# Patient Record
Sex: FEMALE | Race: WHITE | Employment: OTHER | URBAN - METROPOLITAN AREA
[De-identification: names, ages, dates, MRNs, and addresses within clinical notes are randomized per-mention and may not be internally consistent; named-entity substitution may affect disease eponyms.]

---

## 2017-11-21 ENCOUNTER — APPOINTMENT (OUTPATIENT)
Dept: GENERAL RADIOLOGY | Age: 72
End: 2017-11-21
Attending: PHYSICIAN ASSISTANT
Payer: MEDICARE

## 2017-11-21 ENCOUNTER — HOSPITAL ENCOUNTER (EMERGENCY)
Age: 72
Discharge: HOME | End: 2017-11-21
Attending: EMERGENCY MEDICINE
Payer: MEDICARE

## 2017-11-21 VITALS
SYSTOLIC BLOOD PRESSURE: 147 MMHG | WEIGHT: 170 LBS | OXYGEN SATURATION: 100 % | DIASTOLIC BLOOD PRESSURE: 86 MMHG | HEIGHT: 63 IN | TEMPERATURE: 97.6 F | RESPIRATION RATE: 16 BRPM | HEART RATE: 72 BPM | BODY MASS INDEX: 30.12 KG/M2

## 2017-11-21 DIAGNOSIS — S46.911A SHOULDER STRAIN, RIGHT, INITIAL ENCOUNTER: Primary | ICD-10-CM

## 2017-11-21 DIAGNOSIS — M25.511 PAIN IN JOINT OF RIGHT SHOULDER: ICD-10-CM

## 2017-11-21 PROCEDURE — 73030 X-RAY EXAM OF SHOULDER: CPT

## 2017-11-21 PROCEDURE — 99283 EMERGENCY DEPT VISIT LOW MDM: CPT

## 2017-11-21 PROCEDURE — L3670 SO ACRO/CLAV CAN WEB PRE OTS: HCPCS

## 2017-11-21 RX ORDER — TELMISARTAN 20 MG/1
TABLET ORAL DAILY
COMMUNITY

## 2017-11-21 RX ORDER — NAPROXEN 500 MG/1
500 TABLET ORAL 2 TIMES DAILY WITH MEALS
Qty: 20 TAB | Refills: 0 | Status: SHIPPED | OUTPATIENT
Start: 2017-11-21

## 2017-11-21 RX ORDER — GLUCOSAMINE SULFATE 1500 MG
POWDER IN PACKET (EA) ORAL DAILY
COMMUNITY

## 2017-11-21 RX ORDER — TIZANIDINE 2 MG/1
TABLET ORAL
Qty: 15 TAB | Refills: 0 | Status: SHIPPED | OUTPATIENT
Start: 2017-11-21

## 2017-11-21 NOTE — ED TRIAGE NOTES
Patient states right shoulder pain s/p trip and fall incident today. Patient denies LOC. Denies striking head.

## 2017-11-22 NOTE — DISCHARGE INSTRUCTIONS
Musculoskeletal Pain: Care Instructions  Your Care Instructions  Different problems with the bones, muscles, nerves, ligaments, and tendons in the body can cause pain. One or more areas of your body may ache or burn. Or they may feel tired, stiff, or sore. The medical term for this type of pain is musculoskeletal pain. It can have many different causes. Sometimes the pain is caused by an injury such as a strain or sprain. Or you might have pain from using one part of your body in the same way over and over again. This is called overuse. In some cases, the cause of the pain is another health problem such as arthritis or fibromyalgia. The doctor will examine you and ask you questions about your health to help find the cause of your pain. Blood tests or imaging tests like an X-ray may also be helpful. But sometimes doctors can't find a cause of the pain. Treatment depends on your symptoms and the cause of the pain, if known. The doctor has checked you carefully, but problems can develop later. If you notice any problems or new symptoms, get medical treatment right away. Follow-up care is a key part of your treatment and safety. Be sure to make and go to all appointments, and call your doctor if you are having problems. It's also a good idea to know your test results and keep a list of the medicines you take. How can you care for yourself at home? · Rest until you feel better. · Do not do anything that makes the pain worse. Return to exercise gradually if you feel better and your doctor says it's okay. · Be safe with medicines. Read and follow all instructions on the label. ¨ If the doctor gave you a prescription medicine for pain, take it as prescribed. ¨ If you are not taking a prescription pain medicine, ask your doctor if you can take an over-the-counter medicine. · Put ice or a cold pack on the area for 10 to 20 minutes at a time to ease pain. Put a thin cloth between the ice and your skin.   When should you call for help? Call your doctor now or seek immediate medical care if:  · You have new pain, or your pain gets worse. · You have new symptoms such as a fever, a rash, or chills. Watch closely for changes in your health, and be sure to contact your doctor if:  · You do not get better as expected. Where can you learn more? Go to Mailsuite.be  Enter Q624 in the search box to learn more about \"Musculoskeletal Pain: Care Instructions. \"   © 4744-9602 Healthwise, [employer]. Care instructions adapted under license by Nevin Pinto (which disclaims liability or warranty for this information). This care instruction is for use with your licensed healthcare professional. If you have questions about a medical condition or this instruction, always ask your healthcare professional. [address] any warranty or liability for your use of this information. Content Version: 35.1.869656; Current as of: November 20, 2015             Joint Pain: Care Instructions  Your Care Instructions    Many people have small aches and pains from overuse or injury to muscles and joints. Joint injuries often happen during sports or recreation, work tasks, or projects around the home. An overuse injury can happen when you put too much stress on a joint or when you do an activity that stresses the joint over and over, such as using the computer or rowing a boat. You can take action at home to help your muscles and joints get better. You should feel better in 1 to 2 weeks, but it can take 3 months or more to heal completely. Follow-up care is a key part of your treatment and safety. Be sure to make and go to all appointments, and call your doctor if you are having problems. It's also a good idea to know your test results and keep a list of the medicines you take. How can you care for yourself at home? · Do not put weight on the injured joint for at least a day or two.   · For the first day or two after an injury, do not take hot showers or baths, and do not use hot packs. The heat could make swelling worse. · Put ice or a cold pack on the sore joint for 10 to 20 minutes at a time. Try to do this every 1 to 2 hours for the next 3 days (when you are awake) or until the swelling goes down. Put a thin cloth between the ice and your skin. · Wrap the injury in an elastic bandage. Do not wrap it too tightly because this can cause more swelling. · Prop up the sore joint on a pillow when you ice it or anytime you sit or lie down during the next 3 days. Try to keep it above the level of your heart. This will help reduce swelling. · Take an over-the-counter pain medicine, such as acetaminophen (Tylenol), ibuprofen (Advil, Motrin), or naproxen (Aleve). Read and follow all instructions on the label. · After 1 or 2 days of rest, begin moving the joint gently. While the joint is still healing, you can begin to exercise using activities that do not strain or hurt the painful joint. When should you call for help? Call your doctor now or seek immediate medical care if:  ? · You have signs of infection, such as:  ¨ Increased pain, swelling, warmth, and redness. ¨ Red streaks leading from the joint. ¨ A fever. ? Watch closely for changes in your health, and be sure to contact your doctor if:  ? · Your movement or symptoms are not getting better after 1 to 2 weeks of home treatment. Where can you learn more? Go to http://madelyn-enio.info/. Enter P205 in the search box to learn more about \"Joint Pain: Care Instructions. \"  Current as of: March 21, 2017  Content Version: 11.4  © 0266-4036 [employer]. Care instructions adapted under license by [employer] (which disclaims liability or warranty for this information).  If you have questions about a medical condition or this instruction, always ask your healthcare professional. [employer] any warranty or liability for your use of this information.

## 2017-11-22 NOTE — ED PROVIDER NOTES
HPI Comments: Shyam Jarrett is a 71 y.o. female that presents to the Ed with a complaint of right shoulder pain folling a trip and fall earlier today. PT states that she stubbed her toe on carpet causing her to fall. Since then pt has pain to right shoulder which is worse with movement. She rates her pain as 5/10. She denies head injury and LOC    Patient is a 71 y.o. female presenting with shoulder pain. Shoulder Pain           Past Medical History:   Diagnosis Date    Hypertension        Past Surgical History:   Procedure Laterality Date    HX BLADDER SUSPENSION      HX CHOLECYSTECTOMY      HX TUBAL LIGATION           History reviewed. No pertinent family history. Social History     Social History    Marital status:      Spouse name: N/A    Number of children: N/A    Years of education: N/A     Occupational History    Not on file. Social History Main Topics    Smoking status: Never Smoker    Smokeless tobacco: Never Used    Alcohol use No    Drug use: No    Sexual activity: Not on file     Other Topics Concern    Not on file     Social History Narrative    No narrative on file         ALLERGIES: Codeine    Review of Systems   Constitutional: Negative for fatigue and fever. HENT: Negative for congestion. Respiratory: Negative for cough and shortness of breath. Cardiovascular: Negative for chest pain. Gastrointestinal: Negative for abdominal pain, diarrhea, nausea and vomiting. Genitourinary: Negative for difficulty urinating. Musculoskeletal: Positive for arthralgias and myalgias. Skin: Negative for color change and wound. Neurological: Negative for dizziness and headaches. All other systems reviewed and are negative. Vitals:    11/21/17 1817   BP: 147/86   Pulse: 72   Resp: 16   Temp: 97.6 °F (36.4 °C)   SpO2: 100%   Weight: 77.1 kg (170 lb)   Height: 5' 3\" (1.6 m)            Physical Exam   Constitutional: She is oriented to person, place, and time. She appears well-developed and well-nourished. HENT:   Head: Normocephalic and atraumatic. Nose: Nose normal.   Eyes: Conjunctivae are normal. Pupils are equal, round, and reactive to light. Neck: Normal range of motion. Cardiovascular: Normal rate. Pulmonary/Chest: Effort normal. No respiratory distress. Musculoskeletal:        Right shoulder: She exhibits decreased range of motion and tenderness. She exhibits no deformity, no laceration, no pain, no spasm, normal pulse and normal strength. Neurological: She is oriented to person, place, and time. Skin: Skin is warm and dry. Psychiatric: She has a normal mood and affect. Her behavior is normal.        MDM  Number of Diagnoses or Management Options  Diagnosis management comments: XRAY: negative for acute process    Impression: shoulder strain, shoulder pain    Plan: discharge home  Muscle relaxants and anti inflammatory medications  Follow up with PCP       Amount and/or Complexity of Data Reviewed  Tests in the radiology section of CPT®: ordered and reviewed    Risk of Complications, Morbidity, and/or Mortality  Presenting problems: low  Diagnostic procedures: low  Management options: low    Patient Progress  Patient progress: stable    ED Course       Procedures               Vitals:  Patient Vitals for the past 12 hrs:   Temp Pulse Resp BP SpO2   11/21/17 1817 97.6 °F (36.4 °C) 72 16 147/86 100 %         Medications ordered:   Medications - No data to display      Lab findings:  No results found for this or any previous visit (from the past 12 hour(s)). X-Ray, CT or other radiology findings or impressions:  XR SHOULDER RT AP/LAT MIN 2 V    (Results Pending)       Progress notes, Consult notes or additional Procedure notes:       Disposition:  Diagnosis: No diagnosis found.     Disposition: discharge    Follow-up Information     None           Patient's Medications   Start Taking    No medications on file   Continue Taking CHOLECALCIFEROL (VITAMIN D3) 1,000 UNIT CAP    Take  by mouth daily. TELMISARTAN (MICARDIS) 20 MG TABLET    Take  by mouth daily.    These Medications have changed    No medications on file   Stop Taking    No medications on file

## 2018-10-26 ENCOUNTER — HOSPITAL ENCOUNTER (OUTPATIENT)
Dept: PHYSICAL THERAPY | Age: 73
Discharge: HOME | End: 2018-10-26
Payer: MEDICARE

## 2018-10-26 PROCEDURE — 97110 THERAPEUTIC EXERCISES: CPT

## 2018-10-26 PROCEDURE — 97161 PT EVAL LOW COMPLEX 20 MIN: CPT

## 2018-10-26 PROCEDURE — G8985 CARRY GOAL STATUS: HCPCS

## 2018-10-26 PROCEDURE — G8984 CARRY CURRENT STATUS: HCPCS

## 2018-10-26 NOTE — PROGRESS NOTES
[address] [place] [address], [address] [place], [address] [place], [place] [address]y [address] [phone] fax Plan of Care/ Statement of Necessity for Physical Therapy Services Patient name: Giovanny Jc Start of Care: 10/26/2018 Referral source: [place] : 1945 Medical Diagnosis: Pain in right shoulder [M25.511] Onset Date:2017, P/O 18 Treatment Diagnosis: decrease tolerance to ADLs and activities due to pain, LOM, decreased strength right shoulder Prior Hospitalization: see medical history Provider#: [id number] Medications: Verified on Patient summary List  
 Comorbidities: none Prior Level of Function: I all areas of activities, ADLS, Yoga, retired, household chores, community activities The Plan of Care and following information is based on the information from the initial evaluation. Assessment/ watson information:74 YO female diagnosed as above and with S/S consistent with above diagnosis presents to skilled outpatient PT. CCO right arm and shoulder pain and involvement S/P Right RCR 18 out of state in [place] where she resides. She is now here locally for 6 months duration as routine to aid with care of grandchildren while daughter is serving in the [employer]. Pain at evaluation is 0/10 at rest. She is right hand dominant. Previous Treatment/Compliance: MRIs , X-rays, medication, ice , heat, preop PT, eventually leading to surgery right RCR 18. Post op she has had PT since P/O 2 weeks to the present. Pain 0, FOTO 27, Right UE HBH and HBB NA, MMT NA, PROM gentle in supine right shoulder F 140, abd 100, ER 57 and IT 52,right UE shoulder end feels tight and with mild pain, care for abd due to tenodesis. No restrictions given on order. Minimal hypersensitivity to light touch right shoulder and upper arm and moderate trigger point right teres.   
 
 
Evaluation Complexity History LOW Complexity : Zero comorbidities / personal factors that will impact the outcome / POC; Examination MEDIUM Complexity : 3 Standardized tests and measures addressing body structure, function, activity limitation and / or participation in recreation  ;Presentation LOW Complexity : Stable, uncomplicated  ;Clinical Decision Making MEDIUM Complexity : FOTO score of 26-74 Overall Complexity Rating: LOW Problem List: pain affecting function, decrease ROM, decrease strength, decrease ADL/ functional abilitiies, decrease activity tolerance, decrease flexibility/ joint mobility and other FOTO 27 Treatment Plan may include any combination of the following: Therapeutic exercise, Therapeutic activities, Neuromuscular re-education, Physical agent/modality, Manual therapy, Patient education and Self Care training Patient / Family readiness to learn indicated by: asking questions, trying to perform skills and interest 
Persons(s) to be included in education: patient (P) Barriers to Learning/Limitations: None Patient Goal (s): full use of arm  Patient Self Reported Health Status: not answered Rehabilitation Potential: good Short Term Goals: To be accomplished in 6 treatments: 
 1 patient will have established and be I with HEP to aid with progression of skilled PT program 
 EVAL issued CURRENT 
 2 patient will tolerate 30 minutes gentle AAROM -AROM exercises to aid with increase tolerance to ADLs and activities EVAL issued HEP CURRENT 
 3 patient will have FOTO 37 to show improving function with household chores EVAL 27 CURRENT Long Term Goals: To be accomplished in 12 treatments: 
 1 patient will have FOTO 47 to show improving function with household chores EVAL 27 CURRENT 
 2 patient will have gentle MMT Right shoulder 4- to aid with increase tolerance to caring for grand child EVAL NA 
 CURRENT 
 3 Patient will have AAROM-AROM  Right shoulder F/ and ER 50 to aid with increase tolerance to ADLS and activities for home. EVAL PROM F 140, , ER 57, IR 52 CURRENT Frequency / Duration: Patient to be seen 2 times per week for 12 treatments. Patient/ Caregiver education and instruction: Diagnosis, prognosis, self care, activity modification, brace/ splint application and exercises, desensitization massage, GENTLE with abduction and wand exercises 
 [x]  Plan of care has been reviewed with PTA 
 
G-Codes (GP) Carry  Current  CL= 60-79%  Goal  CK= 40-59% The severity rating is based on clinical judgment and the FOTO score. Certification Period: 10/26/18 - 1/24/19 Stephani Pierre, PT 10/26/2018 8:26 AM 
 
________________________________________________________________________ I certify that the above Therapy Services are being furnished while the patient is under my care. I agree with the treatment plan and certify that this therapy is necessary. [de-identified] Signature:____________Date:_________TIME:________ 
 
[employer], Date and Time must be completed for valid certification ** Please sign and return to [address], [address],[facility] 300 [phone] fax

## 2018-10-26 NOTE — PROGRESS NOTES
PT DAILY TREATMENT NOTE/SHOULDER EVAL 10-18 Patient Name: Lara Gowers Date:10/26/2018 : 1945 [x]  Patient  Verified Payor: [place] MEDICARE / Plan: Darrell Mendenhall y / Product Type: Medicare / In time:848  Out time:925 Total Treatment Time (min): 37 Visit #: 1 of 12 Medicare/BCBS Only Total Timed Codes (min):  8 1:1 Treatment Time:  8 Treatment Area: Pain in right shoulder [M25.511] SUBJECTIVE Pain Level (0-10 scale): 0 
[]constant [x]intermittent [x]improving []worsening []no change since onset WORSE taking it out of sling, letting it hang, muscles are tender, BETTER sling, ice and heat, motrin Any medication changes, allergies to medications, adverse drug reactions, diagnosis change, or new procedure performed?: [x] No    [] Yes (see summary sheet for update) Subjective functional status/changes:    
PLOF: I all areas of activities, ADLS, Yoga, retired, household chores, community activities Limitations to PLOF: P/O, pain, weakness Mechanism of Injury: Fall 2017 eventually leading to surgery 18 Current symptoms/Complaints: 69 YO female diagnosed as above and with S/S consistent with above diagnosis presents to skilled outpatient PT. CCO right arm and shoulder pain and involvement S/P Right RCR 18 out of state in [place] where she resides. She is now here locally for 6 months duration as routine to aid with care of grandchildren while daughter is serving in the [employer]. Pain at evaluation is 0/10 at rest. She is right hand dominant. Previous Treatment/Compliance: MRIs , X-rays, medication, ice , heat, preop PT, eventually leading to surgery right RCR 18. Post op she has had PT since P/O 2 weeks to the present. PMHx/Surgical Hx: none Work Hx: retired, cares for grandchildren 6 months out of the year Living Situation: presently 2 story house, lives with spouse and grandchildren Pt Goals: full use of arm Barriers: [x]pain []financial []time []transportation []other Motivation: HIGH Substance use: []Alcohol []Tobacco []other:  
FABQ Score: []low []elevate Cognition: A & O x 4   Other: OBJECTIVE/EXAMINATION Domestic Life: retired, caring for grandchildren, household chores, community, Activity/Recreational Limitations: pain, P/O Mobility: I Self Care: I Modality rationale:    
Type Additional Details  
[] Estim:  []Unatt       []IFC  []Premod []Other:  []w/ice   []w/heat Position: Location:  
[] Estim: []Att    []TENS instruct  []NMES []Other:  []w/US   []w/ice   []w/heat Position: Location:  
[]  Traction: [] Cervical       []Lumbar 
                     [] Prone          []Supine []Intermittent   []Continuous Lbs: 
[] before manual 
[] after manual  
[]  Ultrasound: []Continuous   [] Pulsed []1MHz   []3MHz Location: 
W/cm2:  
[]  Iontophoresis with dexamethasone Location: [] Take home patch  
[] In clinic  
[]  Ice     []  heat 
[]  Ice massage 
[]  Laser  
[]  Anodyne Position: Location:  
[]  Laser with stim 
[]  Other: Position: Location:  
[]  Vasopneumatic Device Pressure:       [] lo [] med [] hi  
Temperature: [] lo [] med [] hi  
[] Skin assessment post-treatment:  []intact []redness- no adverse reaction 
  []redness  adverse reaction:  
 
29 min [x]Eval                  []Re-Eval  
 
 
8 min Therapeutic Exercise:  [x] See flow sheet :  
Rationale: increase ROM, increase strength and improve coordination to improve the patients ability to aid with increase tolerance to ADLS and activities 
 
 min Therapeutic Activity:  []  See flow sheet :  
Rationale:   to improve the patients ability to  
  
 min Neuromuscular Re-education:  []  See flow sheet :  
Rationale:   to improve the patients ability to  
 
 min Manual Therapy:    
Rationale:  to  
 
 min Gait Training:  ___ feet with ___ device on level surfaces with ___ level of assist  
Rationale: With 
 [] TE 
 [] TA 
 [] neuro 
 [] other: Patient Education: [x] Review HEP [] Progressed/Changed HEP based on:  
[] positioning   [] body mechanics   [] transfers   [] heat/ice application   
[] other:   
 
Other Objective/Functional Measures: FALLS RISK IS LOW , no AD use, no LOB observed Physical Therapy Evaluation - Shoulder Posture: [] Poor    [x] Fair    [] Good    Describe: 
 
ROM:  [] Unable to assess at this time Left UE HBH able, HBB to right scapula AROM                                                              PROM Left Right  Left Right Flexion 150  Flexion   140 Extension   Extension Scaption/  Scaptin/ABD   100 ER @ 0 Degrees 65  ER @ 0 Degrees   57 ER @ 90 Degrees   ER @ 90 Degrees IR @ 0 Degrees abdomen  IR @ 0 Degrees   52 End Feel / Painful Arc:right UE shoulder end feels tight with pain Strength:   [x] Unable to assess at this time     Right UE D/T P/O                                                                     
 L (1-5) R (1-5) Pain Flexors 4+ NA [] Yes   [] No  
Abductors 4+ NA [] Yes   [] No  
External Rotators 4+ NA [] Yes   [] No  
Internal Rotators 4+ NA [] Yes   [] No  
Supraspinatus   [] Yes   [] No  
Serratus Anterior   [] Yes   [] No  
Lower Trapezius   [] Yes   [] No  
Elbow Flexion   [] Yes   [] No  
Elbow Extension   [] Yes   [] No  
 
 
Scapulohumoral Control / Rhythm: 
Able to eccentrically lower with good control? Left: [] Yes   [] No     Right: [] Yes   [] No 
 
Accessory Motions: 
 
Palpation 
[x] Min  [] Mod  [] Severe    Location:hypersensitivity to light touch right shoulder and upper arm 
[] Min  [x] Mod  [] Severe    Location:Trigger point right teres [] Min  [] Mod  [] Severe    Location: 
 
Optional Tests:   
Sensation Left Right Reflexes Left Right Biceps (C5)   Biceps (C5) Gambino Radial(C6-7)   Brachioradialis (C6) Gambino Ulnar(C8-T1)   Triceps (C7) Adson's Test  [] Pos   [] Neg Yergason's Test [] Pos   [] Neg 
Melinda's Test  [] Pos   [] Neg De Witt's Sign [] Pos   [] Neg Neer's Test  [] Pos   [] Neg Clunk Test  [] Pos   [] Neg 
Hawkin's Test  [] Pos   [] Neg AC Joint  [] Pos   [] Neg 
Speed's Test  [] Pos   [] Neg SC Joint  [] Pos   [] Neg 
Empty Can  [] Pos   [] Neg Pectoral Tightness [] Pos   [] Neg Anterior Apprehension [] Pos   [] Neg  
Posterior Apprehension [] Pos   [] Neg Other Tests / Comments:  
  
 
Pain Level (0-10 scale) post treatment: 0 
 
ASSESSMENT/Changes in Function: Patient demonstrates the potential to make gains with improved ROM, strength, endurance/activity tolerance, functional FOTO survey score  and all within a reasonable time frame so as to increase their functional independence with ADLs and activities for carryover to  Improved quality of life, tolerance to household chores and community activities and caring for grandchildren. Patient requires skilled Physical Therapy so as to monitor their response to and modify their treatment plan accordingly. Patient appears to be an appropriate candidate for skilled outpatient Physical Therapy. Patient will continue to benefit from skilled PT services to modify and progress therapeutic interventions, address ROM deficits, address strength deficits, analyze and address soft tissue restrictions, analyze and cue movement patterns, analyze and modify body mechanics/ergonomics, assess and modify postural abnormalities and instruct in home and community integration to attain remaining goals. [x]  See Plan of Care 
[]  See progress note/recertification 
[]  See Discharge Summary Progress towards goals / Updated goals: PLAN [x]  Upgrade activities as tolerated     [x]  Continue plan of care 
[]  Update interventions per flow sheet []  Discharge due to:_ 
[]  Other:_ Hollie Rizvi, PT 10/26/2018  8:25 AM

## 2018-10-31 ENCOUNTER — HOSPITAL ENCOUNTER (OUTPATIENT)
Dept: PHYSICAL THERAPY | Age: 73
Discharge: HOME | End: 2018-10-31
Payer: MEDICARE

## 2018-10-31 PROCEDURE — 97140 MANUAL THERAPY 1/> REGIONS: CPT

## 2018-10-31 PROCEDURE — 97110 THERAPEUTIC EXERCISES: CPT

## 2018-10-31 PROCEDURE — 97014 ELECTRIC STIMULATION THERAPY: CPT

## 2018-10-31 NOTE — PROGRESS NOTES
PT DAILY TREATMENT NOTE 10-18 Patient Name: Liberty Patterson Date:10/31/2018 : 1945 [x]  Patient  Verified Payor: [place] MED[place]ARE / Plan: Darrell Naranjo / Product Type: Medicare / In time:1157  Out time:100 Total Treatment Time (min): 60 Visit #: 2 of 12 Medicare/BCBS Only Total Timed Codes (min):  45 1:1 Treatment Time:  45  
 
 
Treatment Area: Pain in right shoulder [M25.511] SUBJECTIVE Pain Level (0-10 scale): 0 Any medication changes, allergies to medications, adverse drug reactions, diagnosis change, or new procedure performed?: [x] No    [] Yes (see summary sheet for update) Subjective functional status/changes:   [] No changes reported Mid back pain, I have some trouble with my back going out but have not had the chance to get it checked out. I was having some shoulder pain last night that was radiating down in to the arm in the muscle , and woke up one time with my hand numb OBJECTIVE Modality rationale: decrease inflammation, decrease pain and increase tissue extensibility to improve the patients ability to aid with increase tolerance to ADLs and activities Min Type Additional Details 15 [x] Estim:  [x]Unatt       [x]IFC  []Premod []Other:  [x]w/ice   []w/heat Position:reclined Location:right shoulder and medial infrascapular region  
 [] Estim: []Att    []TENS instruct  []NMES []Other:  []w/US   []w/ice   []w/heat Position: Location:  
 []  Traction: [] Cervical       []Lumbar 
                     [] Prone          []Supine []Intermittent   []Continuous Lbs: 
[] before manual 
[] after manual  
 []  Ultrasound: []Continuous   [] Pulsed []1MHz   []3MHz W/cm2: 
Location:  
 []  Iontophoresis with dexamethasone Location: [] Take home patch  
[] In clinic  
 []  Ice     []  heat 
[]  Ice massage 
[]  Laser  
[]  Anodyne Position: Location: []  Laser with stim 
[]  Other:  Position: Location:  
 []  Vasopneumatic Device Pressure:       [] lo [] med [] hi  
Temperature: [] lo [] med [] hi  
[] Skin assessment post-treatment:  []intact []redness- no adverse reaction 
  []redness  adverse reaction:  
 
  min []Eval                  []Re-Eval  
 
 
37 min Therapeutic Exercise:  [x] See flow sheet :  
Rationale: increase ROM, increase strength and improve coordination to improve the patients ability to aid with increase tolerance to ADLS and activities 
 
 min Therapeutic Activity:  []  See flow sheet :  
Rationale:   to improve the patients ability to  
  
 min Neuromuscular Re-education:  []  See flow sheet :  
Rationale:  to improve the patients ability to 3 
 
8 min Manual Therapy:  Gentle PROM right shoulder all planes , caution with abduction Rationale: decrease pain, increase ROM and increase tissue extensibility to aid with increase tolerance to ADLS and activities 
 
 min Gait Training:  ___ feet with ___ device on level surfaces with ___ level of assist  
Rationale: With 
 [] TE 
 [] TA 
 [] neuro 
 [] other: Patient Education: [x] Review HEP [] Progressed/Changed HEP based on:  
[] positioning   [] body mechanics   [] transfers   [] heat/ice application   
[] other:   
 
Other Objective/Functional Measures: VC exercises and technique . Trigger point right medial infrascapular region. Pain Level (0-10 scale) post treatment: 0 
 
ASSESSMENT/Changes in Function: tolerated well. Returns for first full day back. Wearing sling at night, and using PRN at home and also wearing when out of the home.   
 
Patient will continue to benefit from skilled PT services to modify and progress therapeutic interventions, address ROM deficits, address strength deficits, analyze and address soft tissue restrictions, analyze and cue movement patterns, analyze and modify body mechanics/ergonomics, assess and modify postural abnormalities and instruct in home and community integration to attain remaining goals. [x]  See Plan of Care 
[]  See progress note/recertification 
[]  See Discharge Summary Progress towards goals / Updated goals: 
Short Term Goals: To be accomplished in 6 treatments: 
            1 patient will have established and be I with HEP to aid with progression of skilled PT program 
            EVAL issued CURRENT progressing 10/31/18 2 patient will tolerate 30 minutes gentle AAROM -AROM exercises to aid with increase tolerance to ADLs and activities EVAL issued HEP CURRENT    Met 10/31/18 3 patient will have FOTO 37 to show improving function with household chores EVAL 27 CURRENT Long Term Goals: To be accomplished in 12 treatments: 
            1 patient will have FOTO 47 to show improving function with household chores EVAL 27 CURRENT 
            2 patient will have gentle MMT Right shoulder 4- to aid with increase tolerance to caring for grand child EVAL NA 
            CURRENT 
            3 Patient will have AAROM-AROM  Right shoulder F/ and ER 50 to aid with increase tolerance to ADLS and activities for home. EVAL PROM F 140, , ER 57, IR 52 CURRENT 
 
 
 
PLAN [x]  Upgrade activities as tolerated     [x]  Continue plan of care 
[]  Update interventions per flow sheet      
[]  Discharge due to:_ 
[]  Other:_ Néstor Gunn, PT 10/31/2018  12:19 PM 
 
Future Appointments Date Time Provider Ana Paris 11/2/2018  9:30 AM Tay Nj PT [facility]  
11/6/2018 12:00 PM Shannan Evans PT [facility]  
11/8/2018  9:30 AM [facility] Evans [facility]  
11/13/2018 11:30 AM Shannan Evans PT [facility]  
11/15/2018 10:00 AM Tay Nj PT [facility]  
 11/29/2018 10:00 AM Kia Wharton PT [facility]

## 2018-11-02 ENCOUNTER — HOSPITAL ENCOUNTER (OUTPATIENT)
Dept: PHYSICAL THERAPY | Age: 73
Discharge: HOME | End: 2018-11-02
Payer: MEDICARE

## 2018-11-02 PROCEDURE — 97140 MANUAL THERAPY 1/> REGIONS: CPT

## 2018-11-02 PROCEDURE — 97110 THERAPEUTIC EXERCISES: CPT

## 2018-11-02 PROCEDURE — 97014 ELECTRIC STIMULATION THERAPY: CPT

## 2018-11-02 NOTE — PROGRESS NOTES
PT DAILY TREATMENT NOTE 10-18 Patient Name: Marissa Silver Date:2018 : 1945 [x]  Patient  Verified Payor: [place] MEDICARE / Plan: Darrell Mendenhall y / Product Type: Medicare / In time:926  Out time:1033 Total Treatment Time (min): 63 Visit #: 3 of 12 Medicare/BCBS Only Total Timed Codes (min):  48 1:1 Treatment Time:  45 Treatment Area: Pain in right shoulder [M25.511] SUBJECTIVE Pain Level (0-10 scale): 0 Any medication changes, allergies to medications, adverse drug reactions, diagnosis change, or new procedure performed?: [x] No    [] Yes (see summary sheet for update) Subjective functional status/changes:   [] No changes reported Doing alright, I am trying to wean out of the sling. I am still doing the exercises twice a day at home. OBJECTIVE Modality rationale: decrease pain and increase tissue extensibility to improve the patients ability to aid with increase tolerance to ADLS and activities Min Type Additional Details 15 [x] Estim:  [x]Unatt       [x]IFC  []Premod []Other:  [x]w/ice   []w/heat Position:reclined Location:right shoulder and scapula  
 [] Estim: []Att    []TENS instruct  []NMES []Other:  []w/US   []w/ice   []w/heat Position: Location:  
 []  Traction: [] Cervical       []Lumbar 
                     [] Prone          []Supine []Intermittent   []Continuous Lbs: 
[] before manual 
[] after manual  
 []  Ultrasound: []Continuous   [] Pulsed []1MHz   []3MHz W/cm2: 
Location:  
 []  Iontophoresis with dexamethasone Location: [] Take home patch  
[] In clinic  
 []  Ice     []  heat 
[]  Ice massage 
[]  Laser  
[]  Anodyne Position: Location:  
 []  Laser with stim 
[]  Other:  Position: Location:  
 []  Vasopneumatic Device Pressure:       [] lo [] med [] hi  
Temperature: [] lo [] med [] hi  
 [] Skin assessment post-treatment:  []intact []redness- no adverse reaction 
  []redness  adverse reaction:  
 
 min []Eval                  []Re-Eval  
 
 
33 min Therapeutic Exercise:  [x] See flow sheet :  
Rationale: increase ROM, increase strength and improve coordination to improve the patients ability to aid with increase tolerance to ADLs and activities 
 
 min Therapeutic Activity:  []  See flow sheet :  
Rationale:   to improve the patients ability to  
  
 min Neuromuscular Re-education:  []  See flow sheet :  
Rationale:  to improve the patients ability to  
 
15 min Manual Therapy:  LAD OSCILL Gentle including GH mobs, PROM all planes right shoulder Rationale: decrease pain, increase ROM and increase tissue extensibility to aid with increase tolerance to ADLS and activities 
 
 min Gait Training:  ___ feet with ___ device on level surfaces with ___ level of assist  
Rationale: With 
 [] TE 
 [] TA 
 [] neuro 
 [] other: Patient Education: [x] Review HEP [] Progressed/Changed HEP based on:  
[] positioning   [] body mechanics   [] transfers   [] heat/ice application   
[] other:   
 
Other Objective/Functional Measures: VC exercises and technique Pain Level (0-10 scale) post treatment: 0 
 
ASSESSMENT/Changes in Function: tolerated well. Patient will continue to benefit from skilled PT services to modify and progress therapeutic interventions, address functional mobility deficits, address ROM deficits, address strength deficits, analyze and address soft tissue restrictions, analyze and cue movement patterns, analyze and modify body mechanics/ergonomics, assess and modify postural abnormalities and instruct in home and community integration to attain remaining goals. [x]  See Plan of Care 
[]  See progress note/recertification 
[]  See Discharge Summary Progress towards goals / Updated goals: 
Short Term Goals: To be accomplished in 6 treatments:             1 patient will have established and be I with HEP to aid with progression of skilled PT program 
            EVAL issued 
            [id number] progressing 10/31/18   11/2/18 
            2 patient will tolerate 30 minutes gentle AAROM -AROM exercises to aid with increase tolerance to ADLs and activities             EVAL issued HEP             CURRENT    Met 10/31/18 11/2/18 
            3 patient will have FOTO 37 to show improving function with household chores             EVAL 27 
            CURRENT Long Term Goals: To be accomplished in 12 treatments: 
            1 patient will have FOTO 47 to show improving function with household chores             EVAL 27 
            CURRENT 
            2 patient will have gentle MMT Right shoulder 4- to aid with increase tolerance to caring for grand child 
            EVAL NA 
            CURRENT 
            3 Patient will have AAROM-AROM  Right shoulder F/ and ER 50 to aid with increase tolerance to ADLS and activities for home. 
            EVAL PROM F 140, , ER 57, IR 52 
            CURRENT 
 
 
 
PLAN 
[]  Upgrade activities as tolerated     []  Continue plan of care 
[]  Update interventions per flow sheet      
[]  Discharge due to:_ 
[]  Other:_ Namrata Stiles PT 11/2/2018  9:32 AM 
 
Future Appointments Date Time Provider Ana Paris 11/6/2018 12:00 PM Quentin Chau PT [facility]  
11/8/2018  9:30 AM Quentin Chau PT [facility]  
11/13/2018 11:30 AM Quentin Chau PT [facility]  
11/15/2018 10:00 AM Bebe Delong PT [facility]  
11/29/2018 10:00 AM Bebe Delong PT [facility]

## 2018-11-06 ENCOUNTER — HOSPITAL ENCOUNTER (OUTPATIENT)
Dept: PHYSICAL THERAPY | Age: 73
Discharge: HOME | End: 2018-11-06
Payer: MEDICARE

## 2018-11-06 PROCEDURE — 97014 ELECTRIC STIMULATION THERAPY: CPT

## 2018-11-06 PROCEDURE — 97140 MANUAL THERAPY 1/> REGIONS: CPT

## 2018-11-06 PROCEDURE — 97110 THERAPEUTIC EXERCISES: CPT

## 2018-11-06 NOTE — PROGRESS NOTES
PT DAILY TREATMENT NOTE 10-18 Patient Name: Shiana Hendrickson Date:2018 : 1945 [x]  Patient  Verified Payor: [place] MEDICARE / Plan: Darrell Naranjo / Product Type: Medicare / In time: 11:52   Out time: 12:59 Total Treatment Time (min): 67 Visit #: 4 of 12 Medicare/BCBS Only Total Timed Codes (min):  52 1:1 Treatment Time:  26  
 
 
Treatment Area: Pain in right shoulder [M25.511] SUBJECTIVE Pain Level (0-10 scale): 1 1/2 Any medication changes, allergies to medications, adverse drug reactions, diagnosis change, or new procedure performed?: [x] No    [] Yes (see summary sheet for update) Subjective functional status/changes:   [] No changes reported Pt reports that she was laying in bed last night with her right arm straight and laying on the bed next to her (no sling). Pt reports that her right arm went numb in this position and reports the numbness was abolished after placing the sling back on. OBJECTIVE Modality rationale: decrease pain and increase tissue extensibility to improve the patients ability to aid with increase tolerance to ADLs and activities Min Type Additional Details 15 [x] Estim:  [x]Unatt       [x]IFC  []Premod []Other:  [x]w/ice   []w/heat Position:reclined Location:right shoulder and scapula   
  [] Estim: []Att    []TENS instruct  []NMES []Other:  []w/US   []w/ice   []w/heat Position: Location:   
  []  Traction: [] Cervical       []Lumbar 
                     [] Prone          []Supine []Intermittent   []Continuous Lbs: 
[] before manual 
[] after manual   
  []  Ultrasound: []Continuous   [] Pulsed []1MHz   []3MHz W/cm2: 
Location:   
  []  Iontophoresis with dexamethasone Location: [] Take home patch  
[] In clinic   
  []  Ice     []  heat 
[]  Ice massage 
[]  Laser  
[]  Anodyne Position: Location:   
  []  Laser with stim []  Other:  Position: Location:   
  []  Vasopneumatic Device Pressure:       [] lo [] med [] hi  
Temperature: [] lo [] med [] hi   
[] Skin assessment post-treatment:  []intact []redness- no adverse reaction 
  []redness  adverse reaction:  
 
37 min Therapeutic Exercise:  [x] See flow sheet :  
Rationale: increase ROM, increase strength and improve coordination to improve the patients ability to aid with increase tolerance to ADLs and activities 15 min Manual Therapy: right GHJ LAD, PROM right shoulder flex and ER with oscillations, DTM right UT and pec minor Rationale: decrease pain, increase ROM and increase tissue extensibility to aid with increase tolerance to ADLS and activities With 
 [] TE 
 [] TA 
 [] neuro 
 [] other: Patient Education: [x] Review HEP [] Progressed/Changed HEP based on:  
[] positioning   [] body mechanics   [] transfers   [] heat/ice application   
[] other:   
 
Other Objective/Functional Measures: Increased pain noted with therapist was passively lowering her right UE back to neutral during PROM right shoulder flex. Pain Level (0-10 scale) post treatment: 0 
 
ASSESSMENT/Changes in Function: Educated pt that the numbness that she was feeling in the subjective comments could have been positional secondary to abolished numbness after placing the sling back on her right UE. No numbness reported in the clinic today. Educated pt to monitor the numbness while at home. We will continue therapy per protocol to improve ROM and mobility. Continue POC as tolerated.   
 
Patient will continue to benefit from skilled PT services to modify and progress therapeutic interventions, address functional mobility deficits, address ROM deficits, address strength deficits, analyze and address soft tissue restrictions, analyze and cue movement patterns, analyze and modify body mechanics/ergonomics, assess and modify postural abnormalities and instruct in home and community integration to attain remaining goals. []  See Plan of Care 
[]  See progress note/recertification 
[]  See Discharge Summary Progress towards goals / Updated goals: 
Short Term Goals: To be accomplished in 6 treatments: 
            1 patient will have established and be I with HEP to aid with progression of skilled PT program 
            EVAL issued 
            [id number] progressing 10/31/18   11/2/18 
            2 patient will tolerate 30 minutes gentle AAROM -AROM exercises to aid with increase tolerance to ADLs and activities             EVAL issued HEP             CURRENT    Met 10/31/18 11/2/18 
            3 patient will have FOTO 37 to show improving function with household chores             EVAL 27 
            CURRENT Long Term Goals: To be accomplished in 12 treatments: 
            1 patient will have FOTO 47 to show improving function with household chores             EVAL 27 
            CURRENT 
            2 patient will have gentle MMT Right shoulder 4- to aid with increase tolerance to caring for grand child 
            EVAL NA 
            CURRENT 
            3 Patient will have AAROM-AROM  Right shoulder F/ and ER 50 to aid with increase tolerance to ADLS and activities for home. 
            EVAL PROM F 140, , ER 57, IR 52 
            CURRENT 
 
PLAN [x]  Upgrade activities as tolerated     [x]  Continue plan of care [x]  Update interventions per flow sheet      
[]  Discharge due to:_ 
[]  Other:_ Carisa Pollack PT 11/6/2018  12:22 PM 
 
Future Appointments Date Time Provider Ana Prais 11/6/2018 12:00 PM Shahnazalba Hernandez, PT [facility]  
11/8/2018  9:30 AM Shahnaz Hernandez, PT [facility]  
11/13/2018 11:30 AM Shahnaz Hernandez, PT [facility]  
11/15/2018 10:00 AM Meg Blanco PT [facility]  
11/29/2018 10:00 AM Meg Blanco PT [facility]

## 2018-11-08 ENCOUNTER — HOSPITAL ENCOUNTER (OUTPATIENT)
Dept: PHYSICAL THERAPY | Age: 73
Discharge: HOME | End: 2018-11-08
Payer: MEDICARE

## 2018-11-08 PROCEDURE — 97014 ELECTRIC STIMULATION THERAPY: CPT

## 2018-11-08 PROCEDURE — 97110 THERAPEUTIC EXERCISES: CPT

## 2018-11-08 PROCEDURE — 97140 MANUAL THERAPY 1/> REGIONS: CPT

## 2018-11-08 NOTE — PROGRESS NOTES
PT DAILY TREATMENT NOTE 10-18 Patient Name: Macho Price Date:2018 : 1945 [x]  Patient  Verified Payor: [place] MEDICARE / Plan: Darrell Naranjo / Product Type: Medicare / In time: 9:29   Out time: 10:24 Total Treatment Time (min): 55 Visit #: 5 of 12 Medicare/BCBS Only Total Timed Codes (min):  40 1:1 Treatment Time:  30 Treatment Area: Pain in right shoulder [M25.511] SUBJECTIVE Pain Level (0-10 scale): stiff Any medication changes, allergies to medications, adverse drug reactions, diagnosis change, or new procedure performed?: [x] No    [] Yes (see summary sheet for update) Subjective functional status/changes:   [] No changes reported Reports having stiffness in the right shoulder today. Reports she notices the numbness in the right UE when her arm is resting to her side in bed and is better with her arm is resting in IR and ABD. OBJECTIVE Modality rationale: decrease pain and increase tissue extensibility to improve the patients ability to aid with increase tolerance to ADLs and activities Min Type Additional Details 15 [x] Estim:  [x]Unatt       [x]IFC  []Premod []Other:  [x]w/ice   []w/heat Position:reclined Location:right shoulder and scapula   
  [] Estim: []Att    []TENS instruct  []NMES []Other:  []w/US   []w/ice   []w/heat Position: Location:   
  []  Traction: [] Cervical       []Lumbar 
                     [] Prone          []Supine []Intermittent   []Continuous Lbs: 
[] before manual 
[] after manual   
  []  Ultrasound: []Continuous   [] Pulsed []1MHz   []3MHz W/cm2: 
Location:   
  []  Iontophoresis with dexamethasone Location: [] Take home patch  
[] In clinic   
  []  Ice     []  heat 
[]  Ice massage 
[]  Laser  
[]  Anodyne Position: Location:   
  []  Laser with stim 
[]  Other:  Position: Location:   []  Vasopneumatic Device Pressure:       [] lo [] med [] hi  
Temperature: [] lo [] med [] hi   
[] Skin assessment post-treatment:  []intact []redness- no adverse reaction 
  []redness  adverse reaction:  
 
28 min Therapeutic Exercise:  [x] See flow sheet :  
Rationale: increase ROM, increase strength and improve coordination to improve the patients ability to aid with increase tolerance to ADLs and activities 12 min Manual Therapy: right GHJ LAD, PROM right shoulder flex, IR and ER with oscillations, DTM right pec minor/bicep/deltoid Rationale: decrease pain, increase ROM and increase tissue extensibility to aid with increase tolerance to ADLS and activities With 
 [] TE 
 [] TA 
 [] neuro 
 [] other: Patient Education: [x] Review HEP [] Progressed/Changed HEP based on:  
[] positioning   [] body mechanics   [] transfers   [] heat/ice application   
[] other:   
 
Other Objective/Functional Measures: Tenderness noted to the right bicep and deltoid during manual but reported improvement in this tenderness post manual. FOTO: 54 points. Pain Level (0-10 scale) post treatment: 0 
 
ASSESSMENT/Changes in Function: Reported no pain post session and states that the estim helps with the pain in the right shoulder. Improvement in FOTO score noted since the evaluation. We will plan on continuing therapy per protocol to help the pt perform functional tasks with more ease. Continue POC as tolerated. Patient will continue to benefit from skilled PT services to modify and progress therapeutic interventions, address functional mobility deficits, address ROM deficits, address strength deficits, analyze and address soft tissue restrictions, analyze and cue movement patterns, analyze and modify body mechanics/ergonomics, assess and modify postural abnormalities and instruct in home and community integration to attain remaining goals. []  See Plan of Care 
[]  See progress note/recertification []  See Discharge Summary Progress towards goals / Updated goals: 
Short Term Goals: To be accomplished in 6 treatments: 
            1 patient will have established and be I with HEP to aid with progression of skilled PT program 
            EVAL issued 
            [id number] progressing 10/31/18   11/2/18 
            2 patient will tolerate 30 minutes gentle AAROM -AROM exercises to aid with increase tolerance to ADLs and activities             EVAL issued HEP             CURRENT    Met 10/31/18 11/2/18 
            3 patient will have FOTO 37 to show improving function with household chores             EVAL 27 
            CURRENT MET 54 points 11/8/2018 Long Term Goals: To be accomplished in 12 treatments: 
            1 patient will have FOTO 47 to show improving function with household chores             EVAL 27 
            CURRENT MET 54 points 11/8/2018 
            2 patient will have gentle MMT Right shoulder 4- to aid with increase tolerance to caring for grand child 
            EVAL NA 
            CURRENT 
            3 Patient will have AAROM-AROM  Right shoulder F/ and ER 50 to aid with increase tolerance to ADLS and activities for home. 
            EVAL PROM F 140, , ER 57, IR 52 
            CURRENT 
 
PLAN [x]  Upgrade activities as tolerated     [x]  Continue plan of care [x]  Update interventions per flow sheet      
[]  Discharge due to:_ 
[]  Other:_ Racheal Narayan, PT 11/8/2018  9:50 AM 
 
Future Appointments Date Time Provider Ana Paris 11/8/2018  9:30 AM Anthony Gonzalez, PT [facility]  
11/13/2018 11:30 AM Anthony Gonzalez PT [facility]  
11/15/2018 10:00 AM Marylin Morrison PT [facility]  
11/29/2018 10:00 AM Marylin Morrison PT [facility]

## 2018-11-13 ENCOUNTER — APPOINTMENT (OUTPATIENT)
Dept: PHYSICAL THERAPY | Age: 73
End: 2018-11-13
Payer: MEDICARE

## 2018-11-14 ENCOUNTER — HOSPITAL ENCOUNTER (OUTPATIENT)
Dept: PHYSICAL THERAPY | Age: 73
Discharge: HOME | End: 2018-11-14
Payer: MEDICARE

## 2018-11-14 PROCEDURE — 97140 MANUAL THERAPY 1/> REGIONS: CPT

## 2018-11-14 PROCEDURE — 97110 THERAPEUTIC EXERCISES: CPT

## 2018-11-14 PROCEDURE — 97014 ELECTRIC STIMULATION THERAPY: CPT

## 2018-11-14 NOTE — PROGRESS NOTES
PT DAILY TREATMENT NOTE 10-18 Patient Name: Adrianne Kelley Date:2018 : 1945 [x]  Patient  Verified Payor: [place] MEDICARE / Plan: [address] [place], [place] [address]y / Product Type: Medicare / In time:958  Out time:1110 Total Treatment Time (min): 58 Visit #: 6 of 12 Medicare/BCBS Only Total Timed Codes (min):  43 1:1 Treatment Time:  25 Treatment Area: Pain in right shoulder [M25.511] SUBJECTIVE Pain Level (0-10 scale): 1 1/2 Any medication changes, allergies to medications, adverse drug reactions, diagnosis change, or new procedure performed?: [x] No    [] Yes (see summary sheet for update) Subjective functional status/changes:   [] No changes reported I am pretty sore today because I did laundry yesterday. OBJECTIVE Modality rationale: decrease pain and increase tissue extensibility to improve the patients ability to aid with increase tolerance to ADLs and activities Min Type Additional Details 15 [x] Estim:  [x]Unatt       [x]IFC  []Premod []Other:  [x]w/ice   []w/heat Position:reclined Location:Right shoulder  
  [] Estim: []Att    []TENS instruct  []NMES []Other:  []w/US   []w/ice   []w/heat Position: Location:  
 []  Traction: [] Cervical       []Lumbar 
                     [] Prone          []Supine []Intermittent   []Continuous Lbs: 
[] before manual 
[] after manual  
 []  Ultrasound: []Continuous   [] Pulsed []1MHz   []3MHz W/cm2: 
Location:  
 []  Iontophoresis with dexamethasone Location: [] Take home patch  
[] In clinic  
 []  Ice     []  heat 
[]  Ice massage 
[]  Laser  
[]  Anodyne Position: Location:  
 []  Laser with stim 
[]  Other:  Position: Location:  
 []  Vasopneumatic Device Pressure:       [] lo [] med [] hi  
Temperature: [] lo [] med [] hi  
[] Skin assessment post-treatment:  []intact []redness- no adverse reaction []redness  adverse reaction:  
 
 min []Eval                  []Re-Eval  
 
 
28 min Therapeutic Exercise:  [x] See flow sheet :  
Rationale: increase ROM, increase strength and improve coordination to improve the patients ability to aid with increase tolerance to ADLs and activities 
 
 min Therapeutic Activity:  []  See flow sheet :  
Rationale:   to improve the patients ability to  
  
 min Neuromuscular Re-education:  []  See flow sheet :  
Rationale:   to improve the patients ability to  
 
15 min Manual Therapy:  right GHJ LAD, PROM right shoulder flex, IR and ER with oscillations, TPR Right teres trigger point Rationale: decrease pain, increase ROM, increase tissue extensibility and decrease trigger points to aid with increase tolerance to ADLs and activities 
 
  min Gait Training:  ___ feet with ___ device on level surfaces with ___ level of assist  
Rationale: With 
 [] TE 
 [] TA 
 [] neuro 
 [] other: Patient Education: [x] Review HEP [] Progressed/Changed HEP based on:  
[] positioning   [] body mechanics   [] transfers   [] heat/ice application   
[] other:   
 
Other Objective/Functional Measures: VC exercises and technique. Increased UBE to 1.5 Pain Level (0-10 scale) post treatment: 0 
 
ASSESSMENT/Changes in Function: tolerated well. Patient will continue to benefit from skilled PT services to modify and progress therapeutic interventions, address ROM deficits, address strength deficits, analyze and address soft tissue restrictions, analyze and cue movement patterns, analyze and modify body mechanics/ergonomics, assess and modify postural abnormalities and instruct in home and community integration to attain remaining goals. [x]  See Plan of Care 
[]  See progress note/recertification 
[]  See Discharge Summary Progress towards goals / Updated goals: 
Short Term Goals: To be accomplished in 6 treatments:             1 patient will have established and be I with HEP to aid with progression of skilled PT program 
            EVAL issued 
            CURRENT progressing 10/31/18   11/2/18  11/14/18 
            2 patient will tolerate 30 minutes gentle AAROM -AROM exercises to aid with increase tolerance to ADLs and activities             EVAL issued HEP             VEVBBEM    EWN 10/31/18 11/2/18 
            3 patient will have FOTO 37 to show improving function with household chores             EVAL 27 
            CURRENT MET 54 points 11/8/2018 Long Term Goals: To be accomplished in 12 treatments: 
            1 patient will have FOTO 47 to show improving function with household chores             EVAL 27 
            CURRENT MET 54 points 11/8/2018 
            2 patient will have gentle MMT Right shoulder 4- to aid with increase tolerance to caring for grand child 
            EVAL NA 
            CURRENT 
            3 Patient will have AAROM-AROM  Right shoulder F/ and ER 50 to aid with increase tolerance to ADLS and activities for home. 
            EVAL PROM F 140, , ER 57, IR 52 
            CURRENT 
 
 
 
PLAN [x]  Upgrade activities as tolerated     [x]  Continue plan of care 
[]  Update interventions per flow sheet      
[]  Discharge due to:_ 
[]  Other:_ Olive Rhodes, PT 11/14/2018  9:47 AM 
 
Future Appointments Date Time Provider Ana Paris 11/14/2018 10:00 AM Meg Blanco, PT [facility]  
11/15/2018 10:00 AM Meg Blanco PT [facility]  
11/29/2018 10:00 AM Meg Blanco PT [facility]

## 2018-11-15 ENCOUNTER — HOSPITAL ENCOUNTER (OUTPATIENT)
Dept: PHYSICAL THERAPY | Age: 73
Discharge: HOME | End: 2018-11-15
Payer: MEDICARE

## 2018-11-15 PROCEDURE — 97014 ELECTRIC STIMULATION THERAPY: CPT

## 2018-11-15 PROCEDURE — 97110 THERAPEUTIC EXERCISES: CPT

## 2018-11-15 PROCEDURE — 97140 MANUAL THERAPY 1/> REGIONS: CPT

## 2018-11-15 NOTE — PROGRESS NOTES
PT DAILY TREATMENT NOTE 10-18 Patient Name: Cheryl Jeffries Date:11/15/2018 : 1945 [x]  Patient  Verified Payor: [place] MEDICARE / Plan: Darrell Naranjo / Product Type: Medicare / In time:1000  Out time:1105 Total Treatment Time (min): 62 Visit #: 7 of 12 Medicare/BCBS Only Total Timed Codes (min):  47 1:1 Treatment Time:  47  
 
 
Treatment Area: Pain in right shoulder [M25.511] SUBJECTIVE Pain Level (0-10 scale): 1 Any medication changes, allergies to medications, adverse drug reactions, diagnosis change, or new procedure performed?: [x] No    [] Yes (see summary sheet for update) Subjective functional status/changes:   [] No changes reported It is a little better. It hurts when you bring it down, not so much going up. My hand went numb 5 times last night. I left my wrist supports in [place] so I am going to [facility] and get some today. OBJECTIVE Modality rationale: decrease pain and increase tissue extensibility to improve the patients ability to aid with increase tolerance to ADLs and activities Min Type Additional Details 15 [x] Estim:  [x]Unatt       [x]IFC  []Premod []Other:  [x]w/ice   []w/heat Position:reclined Location:right shoulder  
 [] Estim: []Att    []TENS instruct  []NMES []Other:  []w/US   []w/ice   []w/heat Position: Location:  
 []  Traction: [] Cervical       []Lumbar 
                     [] Prone          []Supine []Intermittent   []Continuous Lbs: 
[] before manual 
[] after manual  
 []  Ultrasound: []Continuous   [] Pulsed []1MHz   []3MHz W/cm2: 
Location:  
 []  Iontophoresis with dexamethasone Location: [] Take home patch  
[] In clinic  
 []  Ice     []  heat 
[]  Ice massage 
[]  Laser  
[]  Anodyne Position: Location:  
 []  Laser with stim 
[]  Other:  Position: Location:  
 []  Vasopneumatic Device Pressure:       [] lo [] med [] hi  
 Temperature: [] lo [] med [] hi  
[] Skin assessment post-treatment:  []intact []redness- no adverse reaction 
  []redness  adverse reaction:  
 
 min []Eval                  []Re-Eval  
 
 
29 min Therapeutic Exercise:  [x] See flow sheet :  
Rationale: increase ROM, increase strength and improve coordination to improve the patients ability to aid with increase tolerance to ADLS and activities 
 
 min Therapeutic Activity:  []  See flow sheet :  
Rationale:   to improve the patients ability to  
  
5 min Neuromuscular Re-education:  []  See flow sheet :KT I strip 100% I strip mechanical correction anterior shoulder pec insertion Rationale: decrease pain  to improve the patients ability to aid with increase tolerance to ROM and exercises 13 min Manual Therapy:  PROM , all planes right shoulder with gentle overstretch, oscillations and LAD, TPR anteriorly to pec insertion region Rationale: decrease pain, increase ROM, increase tissue extensibility and decrease trigger points to aid with increase tolerance to ADLs and activities 
 
 min Gait Training:  ___ feet with ___ device on level surfaces with ___ level of assist  
Rationale: With 
 [] TE 
 [] TA 
 [] neuro 
 [] other: Patient Education: [x] Review HEP [] Progressed/Changed HEP based on:  
[] positioning   [] body mechanics   [] transfers   [] heat/ice application   
[] other:   
 
Other Objective/Functional Measures: VC exercises and technique, Increased Wall wash to 15X, shrugs and pinches to 15X Pain Level (0-10 scale) post treatment: 0 
 
ASSESSMENT/Changes in Function: tolerated well.   
 
Patient will continue to benefit from skilled PT services to modify and progress therapeutic interventions, address ROM deficits, address strength deficits, analyze and address soft tissue restrictions, analyze and cue movement patterns, analyze and modify body mechanics/ergonomics, assess and modify postural abnormalities and instruct in home and community integration to attain remaining goals. [x]  See Plan of Care 
[]  See progress note/recertification 
[]  See Discharge Summary Progress towards goals / Updated goals: 
Short Term Goals: To be accomplished in 6 treatments: 
            1 patient will have established and be I with HEP to aid with progression of skilled PT program 
            EVAL issued 
            CURRENTmet 11/15/18 
            2 patient will tolerate 30 minutes gentle AAROM -AROM exercises to aid with increase tolerance to ADLs and activities             EVAL issued HEP             CURRENT    Met11/15/18 
            3 patient will have FOTO 37 to show improving function with household chores             EVAL 27 
            CURRENT MET 54 points 11/8/2018 Long Term Goals: To be accomplished in 12 treatments: 
            1 patient will have FOTO 47 to show improving function with household chores             EVAL 27 
            CURRENT MET 54 points 11/8/2018 
            2 patient will have gentle MMT Right shoulder 4- to aid with increase tolerance to caring for grand child 
            EVAL NA 
            CURRENT 
            3 Patient will have AAROM-AROM  Right shoulder F/ and ER 50 to aid with increase tolerance to ADLS and activities for home. 
            EVAL PROM F 140, , ER 57, IR 52 
            CURRENT recheck next session 
  
 
 
 
PLAN [x]  Upgrade activities as tolerated     [x]  Continue plan of care 
[]  Update interventions per flow sheet      
[]  Discharge due to:_ 
[x]  Other:_  Recheck KRIS Stiles PT 11/15/2018  11:06 AM 
 
Future Appointments Date Time Provider Ana Paris 11/29/2018 10:00 AM [facility]

## 2018-11-29 ENCOUNTER — HOSPITAL ENCOUNTER (OUTPATIENT)
Dept: PHYSICAL THERAPY | Age: 73
Discharge: HOME | End: 2018-11-29
Payer: MEDICARE

## 2018-11-29 PROCEDURE — G8985 CARRY GOAL STATUS: HCPCS

## 2018-11-29 PROCEDURE — 97140 MANUAL THERAPY 1/> REGIONS: CPT

## 2018-11-29 PROCEDURE — 97032 APPL MODALITY 1+ESTIM EA 15: CPT

## 2018-11-29 PROCEDURE — G8984 CARRY CURRENT STATUS: HCPCS

## 2018-11-29 PROCEDURE — 97110 THERAPEUTIC EXERCISES: CPT

## 2018-11-29 NOTE — PROGRESS NOTES
PT DAILY TREATMENT NOTE 10-18 Patient Name: Beronica Hernandez Date:2018 : 1945 [x]  Patient  Verified Payor: [place] MEDICARE / Plan: Darrell Mendenhall y / Product Type: Medicare / In time:946  Out time:1100 Total Treatment Time (min): 67 Visit #: 8 of 12 Medicare/BCBS Only Total Timed Codes (min):  57 1:1 Treatment Time:  40 Treatment Area: Pain in right shoulder [M25.511] SUBJECTIVE Pain Level (0-10 scale): sore Any medication changes, allergies to medications, adverse drug reactions, diagnosis change, or new procedure performed?: [x] No    [] Yes (see summary sheet for update) Subjective functional status/changes:   [] No changes reported Notes some benefit from KT, notes increase soreness due to cross stitching OBJECTIVE Modality rationale: decrease pain and increase tissue extensibility to improve the patients ability to aid with increase tolerance to ADLs and activities Min Type Additional Details  
 [] Estim:  []Unatt       []IFC  []Premod []Other:  []w/ice   []w/heat Position: Location:  
10 [x] Estim: [x]Att    []TENS instruct  []NMES [x]Other: LTO []w/US   []w/ice   []w/heat Position:seated Location:right teres and anterior shoulder  
 []  Traction: [] Cervical       []Lumbar 
                     [] Prone          []Supine []Intermittent   []Continuous Lbs: 
[] before manual 
[] after manual  
 []  Ultrasound: []Continuous   [] Pulsed []1MHz   []3MHz W/cm2: 
Location:  
 []  Iontophoresis with dexamethasone Location: [] Take home patch  
[] In clinic  
10 [x]  Ice post    []  heat 
[]  Ice massage 
[]  Laser  
[]  Anodyne Position:reclined Location:right shoulder  
 []  Laser with stim 
[]  Other:  Position: Location:  
 []  Vasopneumatic Device Pressure:       [] lo [] med [] hi  
Temperature: [] lo [] med [] hi  
 [] Skin assessment post-treatment:  []intact []redness- no adverse reaction 
  []redness  adverse reaction:  
 
 min []Eval                  []Re-Eval  
 
 
29 min Therapeutic Exercise:  [x] See flow sheet :  
Rationale: increase ROM, increase strength and improve coordination to improve the patients ability to aid with increase tolerance to ADLs and activities 
 
 min Therapeutic Activity:  []  See flow sheet :  
Rationale:   to improve the patients ability to  
  
8 min Neuromuscular Re-education:  []  See flow sheet :KT I strip 100% mechanical correction teres and also anterior shoulder/pec insertion Rationale: increase ROM and decrease pain  to improve the patients ability to aid with increase tolerance to ADLs and activities 10 min Manual Therapy:  LAD, oscillations, PROM all planes right shoulder, TPR teres Rationale: decrease pain, increase ROM, increase tissue extensibility and decrease trigger points to aid with increase tolerance to ADLS and activities 
 
 min Gait Training:  ___ feet with ___ device on level surfaces with ___ level of assist  
Rationale: With 
 [] TE 
 [] TA 
 [] neuro 
 [] other: Patient Education: [x] Review HEP [] Progressed/Changed HEP based on:  
[] positioning   [] body mechanics   [] transfers   [] heat/ice application   
[] other:   
 
Other Objective/Functional Measures: VC exercises and technique Pain Level (0-10 scale) post treatment: sore ASSESSMENT/Changes in Function: tolerated well with trigger points noted. Protocol reviewed and will progress next session Patient will continue to benefit from skilled PT services to modify and progress therapeutic interventions, address ROM deficits, address strength deficits, analyze and address soft tissue restrictions, analyze and cue movement patterns, analyze and modify body mechanics/ergonomics, assess and modify postural abnormalities and instruct in home and community integration to attain remaining goals. [x]  See Plan of Care [x]  See progress note/recertification 
[]  See Discharge Summary Progress towards goals / Updated goals: 
Short Term Goals: To be accomplished in 6 treatments: 
            1 patient will have established and be I with HEP to aid with progression of skilled PT program 
            EVAL issued 
            CURRENTmet 11/15/18  11/29/18 
            2 patient will tolerate 30 minutes gentle AAROM -AROM exercises to aid with increase tolerance to ADLs and activities             EVAL issued HEP             CURRENT    Met11/15/18 11/29/18 
            3 patient will have FOTO 37 to show improving function with household chores             EVAL 27 
            CURRENT MET 54 points 11/8/2018 Long Term Goals: To be accomplished in 12 treatments: 
            1 patient will have FOTO 47 to show improving function with household chores             EVAL 27 
            CURRENT MET 54 points 11/8/2018 
            2 patient will have gentle MMT Right shoulder 4- to aid with increase tolerance to caring for grand child 
            EVAL NA 
            CURRENT NA 11/29/18 
            3 Patient will have AAROM-AROM  Right shoulder F/ and ER 50 to aid with increase tolerance to ADLS and activities for home. 
            EVAL PROM F 140, , ER 57, IR 52 
            CURRENT PROM F 140, abd 140 ER 60 IR abdomen  11/29/18 
  
 
 
 
PLAN [x]  Upgrade activities as tolerated     [x]  Continue plan of care 
[]  Update interventions per flow sheet      
[]  Discharge due to:_ 
[x]  Other:_  Send medicare progress note Milagros Aase, PT 11/29/2018  9:49 AM 
 
Future Appointments Date Time Provider Ana Paris 11/29/2018 10:00 AM [facility]

## 2018-11-29 NOTE — PROGRESS NOTES
Bernabe SamuelLe Fitzgerald Ksawerego [address], [address] [place], [address] [place] [address],[facility] 300 
[phone] fax Medicare Progress Report Patient name: Prudencio Felty Start of Care: 10/26/18 Referral source: Sidney Sin [place] : 1945 Medical/Treatment Diagnosis: Pain in right shoulder [M25.511] Payor: Huong Redding / Plan: VA MEDICARE PART A & B / Product Type: Medicare /  Onset Date:2017, P/O 18 Prior Hospitalization: see medical history Provider#: [id number] Medications: Verified on Patient Summary List   
Comorbidities: none Prior Level of Function: I all areas of activities, ADLS, Yoga, retired, household chores, community activities 
  
 
Visits from [place] of Care: 8    Missed Visits: 0 Reporting Period: 10/26/18 to 18 Subjective Reports: it is sore from doing some cross stitching Current Status/ treatment goals Objective measures 1. patient will have established and be I with HEP to aid with progression of skilled PT program 
 
 [x] met                 [] not met 
[] progressing Reports compliance 2. patient will tolerate 30 minutes gentle AAROM -AROM exercises to aid with increase tolerance to ADLs and activities [x] met                 [] not met 
[] progressing met 3. patient will have FOTO 37 to show improving function with household chores [x] met                 [] not met 
[] progressing 54 4. patient will have gentle MMT Right shoulder 4- to aid with increase tolerance to caring for grand child 
 
 
 [] met                 [] not met [x] progressing Not assessed Key functional changes: improved tolerance to exercises, decreased pain, increased ROM Problems/ barriers to goal attainment: residual soreness, trigger points and weakness Assessment / Recommendations:Patient demonstrates the potential to make further gains with improving ROM, strength, endurance/activity tolerance, functional FOTO survey score  and all within a reasonable time frame so as to further increase their functional independence with ADLs and activities for carryover to  Improved quality of life, tolerance to household chores, community activities and caring for grandchildren. Patient requires skilled Physical Therapy so as to monitor their response to and modify their treatment plan accordingly. Patient appears to be an appropriate candidate for skilled outpatient Physical Therapy. Problem List: pain affecting function, decrease ROM, decrease strength, decrease ADL/ functional abilitiies, decrease activity tolerance, decrease flexibility/ joint mobility and other FOTO 54 Treatment Plan: Therapeutic exercise, Therapeutic activities, Neuromuscular re-education, Physical agent/modality, Manual therapy, Patient education, Self Care training and Home safety training Patient Goal (s) has been updated and includes: get stronger and have less soreness Updated Goals to be accomplished in 10 treatments: 
 1 patient will have FOTO 67 to show improving function with household chores PN 54 CURRENT 
            2 patient will have gentle MMT Right shoulder 4 to aid with increase tolerance to caring for grand child PN NA 
            CURRENT 
            3 Patient will have AAROM-AROM  Right shoulder F/ and ER 65 to aid with increase tolerance to ADLS and activities for home. PN NA 
            CURRENT Frequency / Duration: Patient to be seen 2-3 times per week for 12 treatments G-Codes (GP) Carry  Current  CK= 40-59%  Goal  CK= 40-59% The severity rating is based on clinical judgment and the FOTO score.  
 
 
Jose M Bello, PT 11/29/2018 10:05 AM

## 2018-12-04 ENCOUNTER — HOSPITAL ENCOUNTER (OUTPATIENT)
Dept: PHYSICAL THERAPY | Age: 73
Discharge: HOME | End: 2018-12-04
Payer: MEDICARE

## 2018-12-04 PROCEDURE — 97110 THERAPEUTIC EXERCISES: CPT

## 2018-12-04 PROCEDURE — 97140 MANUAL THERAPY 1/> REGIONS: CPT

## 2018-12-04 NOTE — PROGRESS NOTES
PT DAILY TREATMENT NOTE 10-18 Patient Name: Clovis Ho Date:2018 : 1945 [x]  Patient  Verified Payor: [place] MEDICARE / Plan: Darrell Mendenhall y / Product Type: Medicare / In time:9:58  Out time:10:57 Total Treatment Time (min): 59 Visit #: 9 of 12 Medicare/BCBS Only Total Timed Codes (min):  49 1:1 Treatment Time:  45  
 
 
Treatment Area: Pain in right shoulder [M25.511] SUBJECTIVE Pain Level (0-10 scale): 1.5 Any medication changes, allergies to medications, adverse drug reactions, diagnosis change, or new procedure performed?: [x] No    [] Yes (see summary sheet for update) Subjective functional status/changes:   [] No changes reported States her bicep is more sore than the back of her shoulder OBJECTIVE Modality rationale: decrease edema, decrease inflammation and decrease pain to improve the patients ability to ease with tolerance to ADLs Min Type Additional Details  
 [] Estim:  []Unatt       []IFC  []Premod []Other:  []w/ice   []w/heat Position: Location:  
 [] Estim: []Att    []TENS instruct  []NMES []Other:  []w/US   []w/ice   []w/heat Position: Location:  
 []  Traction: [] Cervical       []Lumbar 
                     [] Prone          []Supine []Intermittent   []Continuous Lbs: 
[] before manual 
[] after manual  
 []  Ultrasound: []Continuous   [] Pulsed []1MHz   []3MHz W/cm2: 
Location:  
 []  Iontophoresis with dexamethasone Location: [] Take home patch  
[] In clinic  
10 [x]  Ice     []  heat 
[]  Ice massage 
[]  Laser  
[]  Anodyne Position: incline Location:right shoulder  
 []  Laser with stim 
[]  Other:  Position: Location:  
 []  Vasopneumatic Device Pressure:       [] lo [] med [] hi  
Temperature: [] lo [] med [] hi  
[] Skin assessment post-treatment:  []intact []redness- no adverse reaction 
  []redness  adverse reaction: 39 min Therapeutic Exercise:  [x] See flow sheet :  
Rationale: increase ROM, increase strength and increase proprioception to improve the patients ability to perform daily household chores. 10 min Manual Therapy:  PROM right shoulder flexion, abd, IR, ER, LAD Rationale: decrease pain, increase ROM and increase tissue extensibility to assist with overhead activities With 
 [] TE 
 [] TA 
 [] neuro 
 [] other: Patient Education: [x] Review HEP [] Progressed/Changed HEP based on:  
[] positioning   [] body mechanics   [] transfers   [] heat/ice application   
[] other:   
 
Other Objective/Functional Measures: Tolerated addition of theraband exercises with no reports of pain Report of some \"pulling\" with active shoulder abduction around biceps tendon- patient education on stretching below a pain threshold Addition of towel IT stretch-   
 
Pain Level (0-10 scale) post treatment: \"sore\" ASSESSMENT/Changes in Function: Patient continues to show improvements with signs/ symptoms however still demonstrates a decrease in strength, impaired ROM,  and an increase in pain with functional activities. Patient will continue to benefit from skilled PT services to modify and progress therapeutic interventions, address functional mobility deficits, address strength deficits, analyze and cue movement patterns and analyze and modify body mechanics/ergonomics to attain remaining goals. [x]  See Plan of Care 
[]  See progress note/recertification 
[]  See Discharge Summary Progress towards goals / Updated goals: 
Updated Goals to be accomplished in 10 treatments: 
 1 patient will have FOTO 67 to show improving function with household chores             EE 39 
            [id number] 
            2 patient will have gentle MMT Right shoulder 4 to aid with increase tolerance to caring for grand child 
            PN NA 
            CURRENT 
             3 Patient will have AAROM-AROM  Right shoulder F/ and ER 65 to aid with increase tolerance to ADLS and activities for home. 
            PN NA 
            CURRENT 
 
 
 
PLAN [x]  Upgrade activities as tolerated     [x]  Continue plan of care 
[]  Update interventions per flow sheet      
[]  Discharge due to:_ 
[]  Other:_ Noemy Schmidt, PT 12/4/2018  7:42 AM 
 
Future Appointments Date Time Provider [place] 12/4/2018 10:00 AM Shalini Alvarez, PT [facility]  
12/7/2018 10:00 AM Shalini Alvarez, PT [facility]  
12/11/2018 10:30 AM Lauren Montgomery, PT [facility]  
12/13/2018 10:30 AM Lauren Montgomery, PT [facility]  
12/18/2018 10:00 AM Shalini Alvarez, PT [facility]  
12/20/2018 10:30 AM Lauren Montgomery, PT [facility]  
12/27/2018 10:00 AM Lauren Montgomery, PT [facility]

## 2018-12-07 ENCOUNTER — HOSPITAL ENCOUNTER (OUTPATIENT)
Dept: PHYSICAL THERAPY | Age: 73
Discharge: HOME | End: 2018-12-07
Payer: MEDICARE

## 2018-12-07 PROCEDURE — 97140 MANUAL THERAPY 1/> REGIONS: CPT

## 2018-12-07 PROCEDURE — 97110 THERAPEUTIC EXERCISES: CPT

## 2018-12-07 NOTE — PROGRESS NOTES
PT DAILY TREATMENT NOTE 10-18 Patient Name: Leah Kothari Date:2018 : 1945 [x]  Patient  Verified Payor: [place] MEDICARE / Plan: Darrell Senjoe / Product Type: Medicare / In time:9:57  Out time:10:50 Total Treatment Time (min): 53 Visit #: 1 of 12 Medicare/BCBS Only Total Timed Codes (min):  53 1:1 Treatment Time:  48 Treatment Area: Pain in right shoulder [M25.511] SUBJECTIVE Pain Level (0-10 scale): \"sore\" Any medication changes, allergies to medications, adverse drug reactions, diagnosis change, or new procedure performed?: [x] No    [] Yes (see summary sheet for update) Subjective functional status/changes:   [] No changes reported States the biceps is the main area that gives her pain OBJECTIVE 41 min Therapeutic Exercise:  [x] See flow sheet :  
Rationale: increase ROM, increase strength, improve coordination and increase proprioception to improve the patients ability to perform daily household chores. 12 min Manual Therapy:  PROM right shoulder flexion/ abd/ Ir/ ER/ LAD Rationale: decrease pain, increase ROM and increase tissue extensibility to assist with overhead activities With 
 [] TE 
 [] TA 
 [] neuro 
 [] other: Patient Education: [x] Review HEP [] Progressed/Changed HEP based on:  
[] positioning   [] body mechanics   [] transfers   [] heat/ice application   
[] other:   
 
Other Objective/Functional Measures: Tolerated increase in repetitions of theraband exercises well- reports of fatigue after completion (no pain/ discomfort) States she is compliant with HEP - education on not performing strengthening exercises outside of therapy (per protocol to be done 2-3x/ week) Pain Level (0-10 scale) post treatment: 0 
 
ASSESSMENT/Changes in Function: Patient continues to show improvements with signs/ symptoms however still demonstrates a decrease in strength, impaired ROM, and an increase in pain with functional activities. Patient will continue to benefit from skilled PT services to modify and progress therapeutic interventions, address functional mobility deficits, address strength deficits, analyze and cue movement patterns and analyze and modify body mechanics/ergonomics to attain remaining goals. [x]  See Plan of Care 
[]  See progress note/recertification 
[]  See Discharge Summary Progress towards goals / Updated goals: 
Updated Goals to be accomplished in 10 treatments: 
 1 patient will have FOTO 67 to show improving function with household chores             UN 91 
            [id number] 
            2 patient will have gentle MMT Right shoulder 4 to aid with increase tolerance to caring for grand child 
            PN NA 
            CURRENT 
            3 Patient will have AAROM-AROM  Right shoulder F/ and ER 65 to aid with increase tolerance to ADLS and activities for home. 
            PN NA 
            CURRENT 
  
 
 
 
PLAN [x]  Upgrade activities as tolerated     [x]  Continue plan of care 
[]  Update interventions per flow sheet      
[]  Discharge due to:_ 
[]  Other:_ Nikita Castle, PT 12/7/2018  7:45 AM 
 
Future Appointments Date Time Provider [place] 12/7/2018 10:00 AM Mimi Duncan, PT [facility]  
12/11/2018 10:30 AM Shelley Mckeon PT [facility]  
12/13/2018 10:30 AM Shelley Mckeon PT [facility]  
12/18/2018 10:00 AM Mimi Duncan PT [facility]  
12/20/2018 10:30 AM Shelley Mckeon PT [facility]  
12/27/2018 10:00 AM Shelley Mckeon PT [facility]

## 2018-12-11 ENCOUNTER — HOSPITAL ENCOUNTER (OUTPATIENT)
Dept: PHYSICAL THERAPY | Age: 73
Discharge: HOME | End: 2018-12-11
Payer: MEDICARE

## 2018-12-11 PROCEDURE — 97110 THERAPEUTIC EXERCISES: CPT

## 2018-12-11 PROCEDURE — 97140 MANUAL THERAPY 1/> REGIONS: CPT

## 2018-12-11 NOTE — PROGRESS NOTES
PT DAILY TREATMENT NOTE 10-18 Patient Name: Janina Siemens Date:2018 : 1945 [x]  Patient  Verified Payor: [place] MEDICARE / [facility] / Product Type: Medicare / In time:1023  Out time:1126 Total Treatment Time (min): 52 Visit #: 2 of 12 Medicare/BCBS Only Total Timed Codes (min):  42 1:1 Treatment Time:  30 Treatment Area: Pain in right shoulder [M25.511] SUBJECTIVE Pain Level (0-10 scale): 1 1/2 Any medication changes, allergies to medications, adverse drug reactions, diagnosis change, or new procedure performed?: [x] No    [] Yes (see summary sheet for update) Subjective functional status/changes:   [] No changes reported My hand still will go numb at night and when I am cross stitching, it is not painful it just goes numb. OBJECTIVE Modality rationale: post exercise recovery to improve the patients ability to aid with increase tolerance to ADLS and activities Min Type Additional Details  
 [] Estim:  []Unatt       []IFC  []Premod []Other:  []w/ice   []w/heat Position: Location:  
 [] Estim: []Att    []TENS instruct  []NMES []Other:  []w/US   []w/ice   []w/heat Position: Location:  
 []  Traction: [] Cervical       []Lumbar 
                     [] Prone          []Supine []Intermittent   []Continuous Lbs: 
[] before manual 
[] after manual  
 []  Ultrasound: []Continuous   [] Pulsed []1MHz   []3MHz W/cm2: 
Location:  
 []  Iontophoresis with dexamethasone Location: [] Take home patch  
[] In clinic  
10 [x]  Ice  post   []  heat 
[]  Ice massage 
[]  Laser  
[]  Anodyne Position:reclined Location:right shoulder  
 []  Laser with stim 
[]  Other:  Position: Location:  
 []  Vasopneumatic Device Pressure:       [] lo [] med [] hi  
Temperature: [] lo [] med [] hi  
[] Skin assessment post-treatment:  []intact []redness- no adverse reaction []redness  adverse reaction:  
 
 min []Eval                  []Re-Eval  
 
 
32 min Therapeutic Exercise:  [] See flow sheet :  
Rationale: increase ROM, increase strength and improve coordination to improve the patients ability to aid with increase tolerance to ADLs and activities 
 
 min Therapeutic Activity:  []  See flow sheet :  
Rationale:   to improve the patients ability to  
  
 min Neuromuscular Re-education:  []  See flow sheet :  
Rationale:  to improve the patients ability to  
 
10 min Manual Therapy:  PROM all planes, GH mobs for IR, > ER ,Flex, abd  
Rationale: increase ROM and increase tissue extensibility to aid with increase tolerance to ADLS 
 
 min Gait Training:  ___ feet with ___ device on level surfaces with ___ level of assist  
Rationale: With 
 [] TE 
 [] TA 
 [] neuro 
 [] other: Patient Education: [x] Review HEP [] Progressed/Changed HEP based on:  
[] positioning   [] body mechanics   [] transfers   [] heat/ice application   
[] other:   
 
Other Objective/Functional Measures: VC exercises and technique First rib assessment, elevation on left side, manual correction technique applied with improved ROT, LF on the left Pain Level (0-10 scale) post treatment: 0 
 
ASSESSMENT/Changes in Function: tolerated well. Patient will continue to benefit from skilled PT services to modify and progress therapeutic interventions, address ROM deficits, address strength deficits, analyze and address soft tissue restrictions, analyze and cue movement patterns, analyze and modify body mechanics/ergonomics, assess and modify postural abnormalities and instruct in home and community integration to attain remaining goals. [x]  See Plan of Care [x]  See progress note/recertification 
[]  See Discharge Summary Progress towards goals / Updated goals: 
Updated Goals to be accomplished in 10 treatments: 
 1 patient will have FOTO 67 to show improving function with household chores             SANTIAGO 30 
            CURRENT recheck next session 12/11/18 
            2 patient will have gentle MMT Right shoulder 4 to aid with increase tolerance to caring for grand child 
            PN NA 
            CURRENT 
            3 Patient will have AAROM-AROM  Right shoulder F/ and ER 65 to aid with increase tolerance to ADLS and activities for home. 
            PN NA 
            CURRENT recheck next session 12/11/18 PLAN [x]  Upgrade activities as tolerated     [x]  Continue plan of care 
[]  Update interventions per flow sheet      
[]  Discharge due to:_ 
[x]  Other:_  Recheck AROM 12/11/18 Jenny Burden, PT 12/11/2018  10:34 AM 
 
Future Appointments Date Time Provider [place] 12/13/2018 10:30 AM Oneyda Estrada, PT [facility]  
12/18/2018 10:00 AM Candance Francois, PT [facility]  
12/20/2018 10:30 AM Oneyda Natalie, PT [facility]  
12/27/2018 10:00 AM Oneyda Natalie, PT [facility]

## 2018-12-13 ENCOUNTER — HOSPITAL ENCOUNTER (OUTPATIENT)
Dept: PHYSICAL THERAPY | Age: 73
Discharge: HOME | End: 2018-12-13
Payer: MEDICARE

## 2018-12-13 PROCEDURE — 97110 THERAPEUTIC EXERCISES: CPT

## 2018-12-13 PROCEDURE — 97140 MANUAL THERAPY 1/> REGIONS: CPT

## 2018-12-13 NOTE — PROGRESS NOTES
PT DAILY TREATMENT NOTE 10-18    Patient Name: Trisha Do  Date:2018  : 1945  [x]  Patient  Verified  Payor: VA MEDICARE / Plan: VA MEDICARE PART A & B / Product Type: Medicare /    In [id number]  Out time:1140  Total Treatment Time (min): 79  Visit #: 4 of 10 from Medicare progress note    Medicare/BCBS Only   Total Timed Codes (min):  57 1:1 Treatment Time:  40       Treatment Area: Pain in right shoulder [M25.511]    SUBJECTIVE  Pain Level (0-10 scale): 1 1/2  Any medication changes, allergies to medications, adverse drug reactions, diagnosis change, or new procedure performed?: [x] No    [] Yes (see summary sheet for update)  Subjective functional status/changes:   [] No changes reported  I didn't sleep well last night no matter what position I was in. I think I may have overdone it with my wrist doing baking bc when I took the wrist support off my arm went numb more often. I never did get into a deep sleep.      OBJECTIVE    Modality rationale: decrease pain and increase tissue extensibility to improve the patients ability to aid with increase tolerance to ADLs and activities     Min Type Additional Details    [] Estim:  []Unatt       []IFC  []Premod                        []Other:  []w/ice   []w/heat  Position:  Location:    [] Estim: []Att    []TENS instruct  []NMES                    []Other:  []w/US   []w/ice   []w/heat  Position:  Location:    []  Traction: [] Cervical       []Lumbar                       [] Prone          []Supine                       []Intermittent   []Continuous Lbs:  [] before manual  [] after manual    []  Ultrasound: []Continuous   [] Pulsed                           []1MHz   []3MHz W/cm2:  Location:    []  Iontophoresis with dexamethasone         Location: [] Take home patch   [] In clinic   10 [x]  Ice post    []  heat  []  Ice massage  []  Laser   []  Anodyne Position:reclined  Location:right shoulder    []  Laser with stim  []  Other:  Position:  Location: []  Vasopneumatic Device Pressure:       [] lo [] med [] hi   Temperature: [] lo [] med [] hi   [] Skin assessment post-treatment:  []intact []redness- no adverse reaction    []redness  adverse reaction:      min []Eval                  []Re-Eval       34 min Therapeutic Exercise:  [x] See flow sheet :   Rationale: increase ROM, increase strength, improve coordination and increase proprioception to improve the patients ability to aid with increase tolerance to ADLs and activities     min Therapeutic Activity:  []  See flow sheet :   Rationale:   to improve the patients ability to       min Neuromuscular Re-education:  []  See flow sheet :   Rationale:   to improve the patients ability to     23 min Manual Therapy:  GH mobs for IR, PROM all planes, MFR right pec  LAD, gentle oscillations   Rationale: decrease pain, increase ROM, increase tissue extensibility and decrease trigger points to aid with increase tolerance to ADLs and activities     min Gait Training:  ___ feet with ___ device on level surfaces with ___ level of assist   Rationale: With   [] TE   [] TA   [] neuro   [] other: Patient Education: [x] Review HEP    [] Progressed/Changed HEP based on:   [] positioning   [] body mechanics   [] transfers   [] heat/ice application    [] other:      Other Objective/Functional Measures: VC exercises and technique FOTO 62.      Pain Level (0-10 scale) post treatment: 0    ASSESSMENT/Changes in Function: tolerated well. Continues with Right UE S/S hand numbness that mimicks TOS or CTS    Patient will continue to benefit from skilled PT services to modify and progress therapeutic interventions, address ROM deficits, address strength deficits, analyze and address soft tissue restrictions, analyze and cue movement patterns, analyze and modify body mechanics/ergonomics, assess and modify postural abnormalities and instruct in home and community integration to attain remaining goals.      [x]  See Plan of Care  [x]  See progress note/recertification  []  See Discharge Summary         Progress towards goals / Updated goals:  Updated Goals to be accomplished in 10 treatments:   1 patient will have FOTO 67 to show improving function with household chores              PN 54              CURRENT 62 12/13/18              2 patient will have gentle MMT Right shoulder 4 to aid with increase tolerance to caring for grand child              PN NA              CURRENT recheck next session 12/13/18              3 Patient will have AAROM-AROM  Right shoulder F/ and ER 65 to aid with increase tolerance to ADLS and activities for home.              PN NA              CURRENT recheck next session 12/13/18        PLAN  [x]  Upgrade activities as tolerated     [x]  Continue plan of care  []  Update interventions per flow sheet       []  Discharge due to:_  [x]  Other:_  RECHECK AROM and MMT  Georgi Cox, PT 12/13/2018  10:36 AM    Future Appointments   Date Time Provider [place]   12/18/2018 10:00 AM Nirmala Oneill PT [facility]   12/20/2018 10:30 AM Darshan Amaya PT [facility]   12/27/2018 10:00 AM Darshan Amaya PT [facility]

## 2018-12-18 ENCOUNTER — HOSPITAL ENCOUNTER (OUTPATIENT)
Dept: PHYSICAL THERAPY | Age: 73
Discharge: HOME | End: 2018-12-18
Payer: MEDICARE

## 2018-12-18 PROCEDURE — 97140 MANUAL THERAPY 1/> REGIONS: CPT

## 2018-12-18 PROCEDURE — 97110 THERAPEUTIC EXERCISES: CPT

## 2018-12-18 NOTE — PROGRESS NOTES
PT DAILY TREATMENT NOTE 10-18    Patient Name: Jono Molina  Date:2018  : 1945  [x]  Patient  Verified  Payor: VA MEDICARE / Plan: VA MEDICARE PART A & B / Product Type: Medicare /    In time:9:57  Out time:10:47  Total Treatment Time (min): 50  Visit #: 5 of 10    Medicare/BCBS Only   Total Timed Codes (min):  50 1:1 Treatment Time:  50       Treatment Area: Pain in right shoulder [M25.511]    SUBJECTIVE  Pain Level (0-10 scale): 1  Any medication changes, allergies to medications, adverse drug reactions, diagnosis change, or new procedure performed?: [x] No    [] Yes (see summary sheet for update)  Subjective functional status/changes:   [] No changes reported  States she is a little sore today from baking again but not too bad     OBJECTIVE          38 min Therapeutic Exercise:  [x] See flow sheet :   Rationale: increase ROM, increase strength and increase proprioception to improve the patients ability to perform daily household chores.          12 min Manual Therapy:  PROM right shoulder with overpressure flexion/ abd/ IR/ ER, LAD with oscillation, DTM right pec and right upper trap    Rationale: decrease pain, increase ROM, increase tissue extensibility and decrease trigger points to assist with baking at home              With   [] TE   [] TA   [] neuro   [] other: Patient Education: [x] Review HEP    [] Progressed/Changed HEP based on:   [] positioning   [] body mechanics   [] transfers   [] heat/ice application    [] other:      Other Objective/Functional Measures:   TTP along right UT and right pec minor- reports feeling better after manual to upper trap  Tolerated increases in reps well , increased theraband resistance to blue with no changes in symptoms      Pain Level (0-10 scale) post treatment: 0    ASSESSMENT/Changes in Function: Patient continues to show improvements with signs/ symptoms however still demonstrates a decrease in strength, impaired ROM, deficits with balance and an increase in pain with functional activities. Patient will continue to benefit from skilled PT services to modify and progress therapeutic interventions, address functional mobility deficits, address strength deficits, analyze and cue movement patterns and analyze and modify body mechanics/ergonomics to attain remaining goals.      [x]  See Plan of Care  []  See progress note/recertification  []  See Discharge Summary         Progress towards goals / Updated goals:  Updated Goals to be accomplished in 10 treatments:   1 patient will have FOTO 67 to show improving function with household chores              PN 54              CURRENT 62 12/13/18              2 patient will have gentle MMT Right shoulder 4 to aid with increase tolerance to caring for grand child              PN NA              CURRENT recheck next session 12/18/18              3 Patient will have AAROM-AROM  Right shoulder F/ and ER 65 to aid with increase tolerance to ADLS and activities for home.              PN NA              CURRENT AROM flexion ~ 160, abduction ~ 120 before compensation 12-18-18           PLAN  [x]  Upgrade activities as tolerated     [x]  Continue plan of care  []  Update interventions per flow sheet       []  Discharge due to:_  []  Other:_      Con Lundborg, PT 12/18/2018  7:45 AM    Future Appointments   Date Time Provider [place]   12/18/2018 10:00 AM Nikki Morales, PT [facility]   12/20/2018 10:30 AM Stacey Ordonez, PT [facility]   12/27/2018 10:00 AM Stacey Ordonez, PT [facility]

## 2018-12-20 ENCOUNTER — HOSPITAL ENCOUNTER (OUTPATIENT)
Dept: PHYSICAL THERAPY | Age: 73
Discharge: HOME | End: 2018-12-20
Payer: MEDICARE

## 2018-12-20 PROCEDURE — 97140 MANUAL THERAPY 1/> REGIONS: CPT

## 2018-12-20 PROCEDURE — 97110 THERAPEUTIC EXERCISES: CPT

## 2018-12-20 NOTE — PROGRESS NOTES
PT DAILY TREATMENT NOTE 10-18    Patient Name: Jose Stiles  Date:2018  : 1945  [x]  Patient  Verified  Payor: [place] MEDICARE / Plan: VA MEDICARE PART A & B / Product Type: Medicare /    In time:1027  Out time:1108  Total Treatment Time (min): 40  Visit #: 6 of 10    Medicare/BCBS Only   Total Timed Codes (min):  40 1:1 Treatment Time:  40       Treatment Area: Pain in right shoulder [M25.511]    SUBJECTIVE  Pain Level (0-10 scale): 0  Any medication changes, allergies to medications, adverse drug reactions, diagnosis change, or new procedure performed?: [x] No    [] Yes (see summary sheet for update)  Subjective functional status/changes:   [] No changes reported  When the hand goes numb I just shake it off.     OBJECTIVE    Modality rationale:     Min Type Additional Details    [] Estim:  []Unatt       []IFC  []Premod                      []Other:  []w/ice   []w/heat  Position:  Location:    [] Estim: []Att    []TENS instruct  []NMES                    []Other:  []w/US   []w/ice   []w/heat  Position:  Location:    []  Traction: [] Cervical       []Lumbar                       [] Prone          []Supine                       []Intermittent   []Continuous Lbs:  [] before manual  [] after manual    []  Ultrasound: []Continuous   [] Pulsed                           []1MHz   []3MHz W/cm2:  Location:    []  Iontophoresis with dexamethasone         Location: [] Take home patch   [] In clinic    []  Ice     []  heat  []  Ice massage  []  Laser   []  Anodyne Position:  Location:    []  Laser with stim  []  Other:  Position:  Location:    []  Vasopneumatic Device Pressure:       [] lo [] med [] hi   Temperature: [] lo [] med [] hi   [] Skin assessment post-treatment:  []intact []redness- no adverse reaction    []redness  adverse reaction:      min []Eval                  []Re-Eval       30  min Therapeutic Exercise:  [] See flow sheet :   Rationale: increase ROM, increase strength and improve coordination to improve the patients ability to aid with increase tolerance to ADLs and activities     min Therapeutic Activity:  []  See flow sheet :   Rationale:   to improve the patients ability to       min Neuromuscular Re-education:  []  See flow sheet :   Rationale:   to improve the patients ability to     10 min Manual Therapy:  [address] [facility] MOBS for IR, ST mobs LAD, OSCILL, PROM    Rationale: increase ROM and increase tissue extensibility to aid with increase tolerance to ADLs and activities     min Gait Training:  ___ feet with ___ device on level surfaces with ___ level of assist   Rationale: With   [] TE   [] TA   [] neuro   [] other: Patient Education: [x] Review HEP    [] Progressed/Changed HEP based on:   [] positioning   [] body mechanics   [] transfers   [] heat/ice application    [] other:      Other Objective/Functional Measures: VC exercises and tech     Pain Level (0-10 scale) post treatment: 0    ASSESSMENT/Changes in Function: tolerated well with residual LOM IR and HBB for dressing etc. Continues with S/S consistent with questionable Carpal tunnel Right UE    Patient will continue to benefit from skilled PT services to modify and progress therapeutic interventions, address ROM deficits, address strength deficits, analyze and address soft tissue restrictions, analyze and cue movement patterns, analyze and modify body mechanics/ergonomics, assess and modify postural abnormalities and instruct in home and community integration to attain remaining goals.      [x]  See Plan of Care  [x]  See progress note/recertification  []  See Discharge Summary         Progress towards goals / Updated goals:  Updated Goals to be accomplished in 10 treatments:   1 patient will have FOTO 67 to show improving function with household chores              PN 54              CURRENT 62 12/13/18              2 patient will have gentle MMT Right shoulder 4 to aid with increase tolerance to caring for grand child              PN NA              CURRENT recheck next session 12/18/18              3 Patient will have AAROM-AROM  Right shoulder F/ and ER 65 to aid with increase tolerance to ADLS and activities for home.              PN NA              CURRENT AROM flexion ~ 160, abduction ~ 120 before compensation 12-18-18           PLAN  [x]  Upgrade activities as tolerated     [x]  Continue plan of care  []  Update interventions per flow sheet       []  Discharge due to:_  []  Other:_      Daysi Yee, PT 12/20/2018  10:31 AM    Future Appointments   Date Time Provider [place]   12/27/2018 10:00 AM Teri Gerardo, PT [facility]

## 2018-12-27 ENCOUNTER — HOSPITAL ENCOUNTER (OUTPATIENT)
Dept: PHYSICAL THERAPY | Age: 73
Discharge: HOME | End: 2018-12-27
Payer: MEDICARE

## 2018-12-27 PROCEDURE — G8985 CARRY GOAL STATUS: HCPCS

## 2018-12-27 PROCEDURE — G8984 CARRY CURRENT STATUS: HCPCS

## 2018-12-27 PROCEDURE — 97530 THERAPEUTIC ACTIVITIES: CPT

## 2018-12-27 PROCEDURE — 97140 MANUAL THERAPY 1/> REGIONS: CPT

## 2018-12-27 NOTE — PROGRESS NOTES
PT DAILY TREATMENT NOTE 10-18    Patient Name: Lucy Wray  Date:2018  : 1945  [x]  Patient  Verified  Payor: VA MEDICARE / Plan: VA MEDICARE PART A & B / Product Type: Medicare /    In time:1001  Out time:1058  Total Treatment Time (min):45  Visit #: 6 of 10    Medicare/BCBS Only   Total Timed Codes (min):  45 1:1 Treatment Time:  28       Treatment Area: Pain in right shoulder [M25.511]    SUBJECTIVE  Pain Level (0-10 scale): 0  Any medication changes, allergies to medications, adverse drug reactions, diagnosis change, or new procedure performed?: [x] No    [] Yes (see summary sheet for update)  Subjective functional status/changes:   [] No changes reported  90 % overall improved .      OBJECTIVE    Modality rationale:     Type Additional Details   [] Estim:  []Unatt       []IFC  []Premod                        []Other:  []w/ice   []w/heat  Position:  Location:   [] Estim: []Att    []TENS instruct  []NMES                    []Other:  []w/US   []w/ice   []w/heat  Position:  Location:   []  Traction: [] Cervical       []Lumbar                       [] Prone          []Supine                       []Intermittent   []Continuous Lbs:  [] before manual  [] after manual   []  Ultrasound: []Continuous   [] Pulsed                           []1MHz   []3MHz W/cm2:  Location:   []  Iontophoresis with dexamethasone         Location: [] Take home patch   [] In clinic   []  Ice     []  heat  []  Ice massage  []  Laser   []  Anodyne Position:  Location:   []  Laser with stim  []  Other:  Position:  Location:   []  Vasopneumatic Device Pressure:       [] lo [] med [] hi   Temperature: [] lo [] med [] hi   [] Skin assessment post-treatment:  []intact []redness- no adverse reaction    []redness  adverse reaction:      min []Eval                  []Re-Eval       27 min Therapeutic Exercise:  [x] See flow sheet :   Rationale: increase ROM, increase strength and improve coordination to improve the patients ability to aid with increase tolerance to ADLS and activities    8 min Therapeutic Activity:  []  See flow sheet :FOTO and GOALS       min Neuromuscular Re-education:  []  See flow sheet :   Rationale:   to improve the patients ability to     10 min Manual Therapy:  D[employer] GOSS mobs for IR, OSCILL   Rationale: increase ROM and increase tissue extensibility to aid with increase tolerance to ADLS and activities     min Gait Training:  ___ feet with ___ device on level surfaces with ___ level of assist   Rationale: With   [] TE   [] TA   [] neuro   [] other: Patient Education: [x] Review HEP    [] Progressed/Changed HEP based on:   [] positioning   [] body mechanics   [] transfers   [] heat/ice application    [] other:      Other Objective/Functional Measures: FOTO 65    AROM  Right shoulder F  140  ABD   130  ER  80    VC exercises and tech  Pain Level (0-10 scale) post treatment: 0    ASSESSMENT/Changes in Function: tolerated well. Patient will continue to benefit from skilled PT services to modify and progress therapeutic interventions, address ROM deficits, address strength deficits, analyze and address soft tissue restrictions, analyze and cue movement patterns, analyze and modify body mechanics/ergonomics, assess and modify postural abnormalities and instruct in home and community integration to attain remaining goals.      [x]  See Plan of Care  [x]  See progress note/recertification  []  See Discharge Summary         Progress towards goals / Updated goals:   Updated Goals to be accomplished in 10 treatments:   1 patient will have FOTO 67 to show improving function with household chores              PN 54              CURRENT 65 12/27/18              2 patient will have gentle MMT Right shoulder 4 to aid with increase tolerance to caring for grand child              PN NA              CURRENT NA              3 Patient will have AAROM-AROM  Right shoulder F/ and ER 65 to aid with increase tolerance to ADLS and activities for home.              PN NA              CURRENT AROM  Right shoulder F  140  ABD   130  ER  80 12/27/18      PLAN  [x]  Upgrade activities as tolerated     [x]  Continue plan of care  []  Update interventions per flow sheet       []  Discharge due to:_  [x]  Other:_  Send Medicare recert York Poisson, PT 12/27/2018  10:00 AM    No future appointments.

## 2018-12-27 NOTE — PROGRESS NOTES
[facility] [address], [address] [place] [address], [facility] [address] [address]  [phone] fax      Continued Plan of Care/ Re-certification for Physical Therapy Services    Patient name: Kitty Espinoza Start of Care: 10/26/18   Referral source: Shannan Hancock, 99Shakila Gomez : 1945   Medical/Treatment Diagnosis: Pain in right shoulder [M25.511]  Payor: Agueda Ramesh / Plan: VA MEDICARE PART A & B / Product Type: Medicare /  Onset Date:2017, P/O 18     Prior Hospitalization: see medical history Provider#: [id number]   Medications: Verified on Patient Summary List    Comorbidities: none   Prior Level of Function: I all areas of activities, ADLS, Yoga, retired, household chores, community activities         Visits from [place] of Care: 15    Missed Visits: 0    The Plan of Care and following information is based on the patient's current status:  Frieda Templeton will have FOTO 67 to show improving function with household chores  Status at last note/certification:last MD note  Current Status: not met, 72    Goal:patient will have gentle MMT Right shoulder 4 to aid with increase tolerance to caring for grand child  Status at last note/certification:last MD note  Current Status: not met, progressing  NA    Goal:Patient will have AAROM-AROM  Right shoulder F/ and ER 65 to aid with increase tolerance to ADLS and activities for home.   Status at last note/certification:last MD note  Current Status: not met, progressing AROM  Right shoulder F  140  ABD   130  ER  80    Key functional changes: increased ROM and tolerance to exercises      Problems/ barriers to goal attainment: residual decrease ROM HBB     Problem List: decrease ROM, decrease strength, decrease ADL/ functional abilitiies, decrease activity tolerance, decrease flexibility/ joint mobility and other FOTO 65    Treatment Plan: Therapeutic exercise, Therapeutic activities, Neuromuscular re-education, Physical agent/modality, Manual therapy, Patient education, Self Care training and Home safety training     Patient Goal (s) has been updated and includes: get the hand motion behind my back better     Goals for this certification period to be accomplished in 10 treatments:   Updated Goals to be accomplished in 10 treatments:           1 patient will have FOTO 72 to show improving function with household chores  [phone] patient will have gentle MMT Right shoulder 4 to aid with increase tolerance to caring for grand child              PN NA              CURRENT                3 Patient will have AAROM-AROM  Right shoulder F/ and IR HBB to LS region  to aid with increase tolerance to ADLS and activities for home.              PN Flexion 140    abd 130, ER 80  IR to hip              CURRENT            Frequency / Duration: Patient to be seen 2-3 times per week for 10 treatments:    Assessment / Recommendations:Patient demonstrates the potential to make further gains with improving ROM, strength, endurance/activity tolerance, functional FOTO survey score  and all within a reasonable time frame so as to further increase their functional independence with ADLs and activities for carryover to  Improved quality of life and tolerance to household chores and community activiites. Patient requires skilled Physical Therapy so as to monitor their response to and modify their treatment plan accordingly. Patient appears to be an appropriate candidate for skilled outpatient Physical Therapy. G-Codes (GP)   Carry   Current  CJ= 20-39%   [id number] Goal  CK= 40-59%     The severity rating is based on clinical judgment and the FOTO score.     Certification Period: 12/27/18 - 2/25/19    Teresita Christianson, PT 12/27/2018 10:00 AM    ________________________________________________________________________  I certify that the above Therapy Services are being furnished while the patient is under my care. I agree with the treatment plan and certify that this therapy is necessary. [] I have read the above and request that my patient continue as recommended.   [] I have read the above report and request that my patient continue therapy with the following changes/special instructions: _____________________________________________  [] I have read the above report and request that my patient be discharged from therapy    Physician's Signature:____________Date:_________TIME:________    ** Signature, Date and Time must be completed for valid certification **    Please sign and return to [facility] [address] [place] [address], [address] [facility] [address],[facility] [address]  [phone] fax

## 2019-01-02 ENCOUNTER — HOSPITAL ENCOUNTER (OUTPATIENT)
Dept: PHYSICAL THERAPY | Age: 74
Discharge: HOME | End: 2019-01-02
Payer: MEDICARE

## 2019-01-02 PROCEDURE — 97110 THERAPEUTIC EXERCISES: CPT

## 2019-01-02 PROCEDURE — 97140 MANUAL THERAPY 1/> REGIONS: CPT

## 2019-01-02 NOTE — PROGRESS NOTES
PT DAILY TREATMENT NOTE 10-18 Patient Name: Janee Araiza Date:2019 : 1945 [x]  Patient  Verified Payor: Oracio Hackett / Plan: [employer] / Product Type: Mariana Lizzie / In time: 10:30  Out time: 11:31 Total Treatment Time (min): 61 Visit #: 7 of 10 Medicare/BCBS Only Total Timed Codes (min):  51 1:1 Treatment Time:  32 Treatment Area: Pain in right shoulder [M25.511] SUBJECTIVE Pain Level (0-10 scale): 2.5 Any medication changes, allergies to medications, adverse drug reactions, diagnosis change, or new procedure performed?: [x] No    [] Yes (see summary sheet for update) Subjective functional status/changes:   [] No changes reported Reports having increased pain in the right lateral/anterior GHJ region after lifting her arm up [facility] yesterday and felt the pain at that time and this morning. Pt also reports she has pain in her left thumb as well but this it is from overuse. OBJECTIVE Modality rationale: decrease pain and increase tissue extensibility to improve the patients ability to tolerate ADLs Min Type Additional Details  
 [] Estim:  []Unatt       []IFC  []Premod []Other:  []w/ice   []w/heat Position: Location:  
 [] Estim: []Att    []TENS instruct  []NMES []Other:  []w/US   []w/ice   []w/heat Position: Location:  
 []  Traction: [] Cervical       []Lumbar 
                     [] Prone          []Supine []Intermittent   []Continuous Lbs: 
[] before manual 
[] after manual  
 []  Ultrasound: []Continuous   [] Pulsed []1MHz   []3MHz Location: 
W/cm2:  
 []  Iontophoresis with dexamethasone Location: [] Take home patch  
[] In clinic  
10 []  Ice     [x]  heat 
[]  Ice massage 
[]  Laser  
[]  Anodyne Position: supine Location: right shoulder/GHJ  
 []  Laser with stim 
[]  Other: Position: Location: []  Vasopneumatic Device Pressure:       [] lo [] med [] hi  
Temperature: [] lo [] med [] hi  
[] Skin assessment post-treatment:  []intact []redness- no adverse reaction 
  []redness  adverse reaction:  
 
39 min Therapeutic Exercise:  [x] See flow sheet :  
Rationale: increase ROM, increase strength and improve coordination to improve the patients ability to aid with increase tolerance to ADLS and activities 12 min Manual Therapy: DTM right anterior/lateral GHJ/deltoid, DTM/TPR right pec minor. Rationale: increase ROM and increase tissue extensibility to aid with increase tolerance to ADLS and activities With 
 [] TE 
 [] TA 
 [] neuro 
 [] other: Patient Education: [x] Review HEP [] Progressed/Changed HEP based on:  
[] positioning   [] body mechanics   [] transfers   [] heat/ice application   
[] other:   
 
Other Objective/Functional Measures: Tenderness to palpation noted to the right anterior/lateral GHJ and pec minor during manual. Improvement in this tenderness noted with manual interventions. Pain Level (0-10 scale) post treatment: 1 ASSESSMENT/Changes in Function: Performed exercises to tolerance secondary to subjective comments today. We will plan on continuing therapy to improve strength and mobility/pain in the right shoulder to improve ease of functional tasks and independence. Continue POC as tolerated. Patient will continue to benefit from skilled PT services to modify and progress therapeutic interventions, address ROM deficits, address strength deficits, analyze and address soft tissue restrictions, analyze and cue movement patterns, analyze and modify body mechanics/ergonomics, assess and modify postural abnormalities and instruct in home and community integration to attain remaining goals. []  See Plan of Care 
[]  See progress note/recertification 
[]  See Discharge Summary Progress towards goals / Updated goals: Updated Goals to be accomplished in 10 treatments: 
         1 patient will have FOTO 72 to show improving function with household chores             FQ 92 
            AKGIEUH  
            2 patient will have gentle MMT Right shoulder 4 to aid with increase tolerance to caring for grand child 
            PN NA 
            CURRENT   
            3 Patient will have AAROM-AROM  Right shoulder F/ and IR HBB to LS region  to aid with increase tolerance to ADLS and activities for home. 
            PN Flexion 140    abd 130, ER 80  IR to hip 
            CURRENT  
  
PLAN [x]  Upgrade activities as tolerated     [x]  Continue plan of care [x]  Update interventions per flow sheet      
[]  Discharge due to:_ 
[]  Other:_ Neeta Pierre, PT 1/2/2019  10:59 AM 
 
Future Appointments Date Time Provider Ana [place] 1/2/2019 10:30 AM Tristian Peterson, PT [facility]  
1/4/2019  8:30 AM Tristian Peterson, PT [facility]  
1/8/2019  9:00 AM Ana Lilia Bloom, PT [facility]  
1/11/2019  9:00 AM Ana Lilia Bloom PT [facility]  
1/15/2019  9:00 AM Ana Lilia Bloom, PT [facility]  
1/17/2019  8:30 AM Ana Lilia Bloom, PT [facility]  
1/22/2019  9:00 AM Ana Lilia Bloom PT [facility]  
1/24/2019  8:30 AM Ana Lilia Bloom PT [facility]  
1/29/2019  8:30 AM Piedad Robles, PT [facility]  
1/31/2019  9:00 AM Ana Lilia Bloom, PT [facility]

## 2019-01-04 ENCOUNTER — HOSPITAL ENCOUNTER (OUTPATIENT)
Dept: PHYSICAL THERAPY | Age: 74
Discharge: HOME | End: 2019-01-04
Payer: MEDICARE

## 2019-01-04 PROCEDURE — 97140 MANUAL THERAPY 1/> REGIONS: CPT

## 2019-01-04 NOTE — PROGRESS NOTES
PT DAILY TREATMENT NOTE 10-18 Patient Name: Janee Araiza Date:2019 : 1945 [x]  Patient  Verified Payor: [place] MEDICARE / Plan: [address] / Product Type: Medicare / In time: 8:39   Out time: 9:18 Total Treatment Time (min): 39 Visit #: 8 of 10 Medicare/BCBS Only Total Timed Codes (min):  39 1:1 Treatment Time: 19 Treatment Area: Pain in right shoulder [M25.511] SUBJECTIVE Pain Level (0-10 scale): 0 Any medication changes, allergies to medications, adverse drug reactions, diagnosis change, or new procedure performed?: [x] No    [] Yes (see summary sheet for update) Subjective functional status/changes:   [] No changes reported Pt reports that she felt a non-painful pop yesterday in the right shoulder and it felt good after. Pt reports she was sore and tender after manual last session but it felt better the next day. OBJECTIVE 27 min Therapeutic Exercise:  [x] See flow sheet :  
Rationale: increase ROM, increase strength and improve coordination to improve the patients ability to aid with increase tolerance to ADLS and activities 12 min Manual Therapy: DTM/TPR right pec minor and anterior deltoid, STM scar massage right shoulder long axis distraction, right shoulder IR PROM with posterior glide, grade 3 right posterior GHJ glide Rationale: increase ROM and increase tissue extensibility to aid with increase tolerance to ADLS and activities With 
 [] TE 
 [] TA 
 [] neuro 
 [] other: Patient Education: [x] Review HEP [] Progressed/Changed HEP based on:  
[] positioning   [] body mechanics   [] transfers   [] heat/ice application   
[] other:   
 
Other Objective/Functional Measures: Tenderness noted to the right pec minor and anterior deltoid region during manual interventions. Added tband bicep curl, wall push ups, 1# weight to right shoulder AROM 4-way to improve strength and ROM in the right UE. Pain Level (0-10 scale) post treatment: \"discomfort\" ASSESSMENT/Changes in Function: Continues to have limited right shoulder IR PROM as noted with manual interventions. Reported having some discomfort in the right shoulder post session but denied ice pack. Educated pt that she may experience soreness today secondary to adding new exercises and weights to exercises. We will plan on continuing therapy at this time to improve strength, mobility and pain to improve the pt's ease of performing functional tasks. Continue POC as tolerated. Patient will continue to benefit from skilled PT services to modify and progress therapeutic interventions, address ROM deficits, address strength deficits, analyze and address soft tissue restrictions, analyze and cue movement patterns, analyze and modify body mechanics/ergonomics, assess and modify postural abnormalities and instruct in home and community integration to attain remaining goals. []  See Plan of Care 
[]  See progress note/recertification 
[]  See Discharge Summary Progress towards goals / Updated goals: 
 Updated Goals to be accomplished in 10 treatments: 
         1 patient will have FOTO 72 to show improving function with household chores             YE 36 
            [id number]  
            2 patient will have gentle MMT Right shoulder 4 to aid with increase tolerance to caring for grand child 
            PN NA 
            CURRENT   
            3 Patient will have AAROM-AROM  Right shoulder F/ and IR HBB to LS region  to aid with increase tolerance to ADLS and activities for home. 
            PN Flexion 140    abd 130, ER 80  IR to hip 
            CURRENT  
  
PLAN [x]  Upgrade activities as tolerated     [x]  Continue plan of care [x]  Update interventions per flow sheet      
[]  Discharge due to:_ 
[]  Other:_ Elfego Nichols, PT 1/4/2019  8:59 AM 
 
Future Appointments Date Time Provider Ana Paris 1/4/2019  8:30 AM Era Pinzon, PT [facility]  
1/8/2019  9:00 AM Sherie Do, PT [facility]  
1/11/2019  9:00 AM Sherie Do, PT [facility]  
1/15/2019  9:00 AM Sherie Do, PT [facility]  
1/17/2019  8:30 AM Sherie Do, PT [facility]  
1/22/2019  9:00 AM Sherie Do, PT [facility]  
1/24/2019  8:30 AM Sherie oD, PT [facility]  
1/29/2019  8:30 AM Pippa Parrish, PT [facility]  
1/31/2019  9:00 AM Sherie Do, PT [facility]

## 2019-01-08 ENCOUNTER — HOSPITAL ENCOUNTER (OUTPATIENT)
Dept: PHYSICAL THERAPY | Age: 74
Discharge: HOME | End: 2019-01-08
Payer: MEDICARE

## 2019-01-08 PROCEDURE — 97140 MANUAL THERAPY 1/> REGIONS: CPT

## 2019-01-08 PROCEDURE — 97110 THERAPEUTIC EXERCISES: CPT

## 2019-01-08 NOTE — PROGRESS NOTES
PT DAILY TREATMENT NOTE 10-18 Patient Name: Zachary Valentine Date:2019 : 1945 [x]  Patient  Verified Payor: [place] MEDICARE / Plan: Darrell Naranjo / Product Type: Medicare / In time:8:52  Out time:9:48 Total Treatment Time (min): 56 Visit #: 9 of 10 Medicare/BCBS Only Total Timed Codes (min):  46 1:1 Treatment Time:  30 Treatment Area: Pain in right shoulder [M25.511] SUBJECTIVE Pain Level (0-10 scale): 0 Any medication changes, allergies to medications, adverse drug reactions, diagnosis change, or new procedure performed?: [x] No    [] Yes (see summary sheet for update) Subjective functional status/changes:   [] No changes reported States she is having most trouble getting her arm behind her back and across her body OBJECTIVE Modality rationale: decrease edema, decrease inflammation and decrease pain to improve the patients ability to ease with tolerance to ADLs Min Type Additional Details  
 [] Estim:  []Unatt       []IFC  []Premod []Other:  []w/ice   []w/heat Position: Location:  
 [] Estim: []Att    []TENS instruct  []NMES []Other:  []w/US   []w/ice   []w/heat Position: Location:  
 []  Traction: [] Cervical       []Lumbar 
                     [] Prone          []Supine []Intermittent   []Continuous Lbs: 
[] before manual 
[] after manual  
 []  Ultrasound: []Continuous   [] Pulsed []1MHz   []3MHz W/cm2: 
Location:  
 []  Iontophoresis with dexamethasone Location: [] Take home patch  
[] In clinic  
10 [x]  Ice     []  heat 
[]  Ice massage 
[]  Laser  
[]  Anodyne Position: inclined Location: right shoulder  
 []  Laser with stim 
[]  Other:  Position: Location:  
 []  Vasopneumatic Device Pressure:       [] lo [] med [] hi  
Temperature: [] lo [] med [] hi  
[] Skin assessment post-treatment:  []intact []redness- no adverse reaction []redness  adverse reaction:  
 
 
34 min Therapeutic Exercise:  [x] See flow sheet :  
Rationale: increase ROM, increase strength and increase proprioception to improve the patients ability to perform daily household chores. 12 min Manual Therapy:  PROM with overpressure flexion/ abd/ IR/ ER, LAD with oscillation, manual adduction stretch- focus on adduction and IR Rationale: decrease pain, increase ROM and increase tissue extensibility to assist with overhead activities With 
 [] TE 
 [] TA 
 [] neuro 
 [] other: Patient Education: [x] Review HEP [] Progressed/Changed HEP based on:  
[] positioning   [] body mechanics   [] transfers   [] heat/ice application   
[] other:   
 
Other Objective/Functional Measures: Tolerates all therex well- requires cueing throughout for proper form Reports tenderness in infraspinatus with notable trigger points Pain Level (0-10 scale) post treatment: 0 
 
ASSESSMENT/Changes in Function: Patient continues to show improvements with signs/ symptoms however still demonstrates a decrease in strength, impaired ROM,  and an increase in pain with functional activities. Patient will continue to benefit from skilled PT services to modify and progress therapeutic interventions, address functional mobility deficits, address ROM deficits, address strength deficits, analyze and cue movement patterns and analyze and modify body mechanics/ergonomics to attain remaining goals. [x]  See Plan of Care 
[]  See progress note/recertification 
[]  See Discharge Summary Progress towards goals / Updated goals: 
Updated Goals to be accomplished in 10 treatments: 
         4 patient will have FOTO 72 to show improving function with household chores             PB 08 
            ESXYGNE  
            2 patient will have gentle MMT Right shoulder 4 to aid with increase tolerance to caring for grand child 
            PN NA 
            CURRENT   
             3 Patient will have AAROM-AROM  Right shoulder F/ and IR HBB to LS region  to aid with increase tolerance to ADLS and activities for home. 
            PN Flexion 140    abd 130, ER 80  IR to hip 
            CURRENT  
 
 
 
PLAN [x]  Upgrade activities as tolerated     [x]  Continue plan of care 
[]  Update interventions per flow sheet      
[]  Discharge due to:_ 
[]  Other:_ Brittney Lopez, PT 1/8/2019  8:19 AM 
 
Future Appointments Date Time Provider [place] 1/8/2019  9:00 AM Vincent Mess, PT [facility]  
1/11/2019  9:00 AM Vincent Mess, PT [facility]  
1/15/2019  9:00 AM Vincent Mess, PT [facility]  
1/17/2019  8:30 AM Vincent Mess, PT [facility]  
1/22/2019  9:00 AM Vincent Mess, PT [facility]  
1/24/2019  8:30 AM Vincent Mess, PT [facility]  
1/29/2019  8:30 AM Nicholas Lancaster, PT [facility]  
1/31/2019  9:00 AM Vincent Mess, PT [facility]

## 2019-01-11 ENCOUNTER — HOSPITAL ENCOUNTER (OUTPATIENT)
Dept: PHYSICAL THERAPY | Age: 74
Discharge: HOME | End: 2019-01-11
Payer: MEDICARE

## 2019-01-11 PROCEDURE — 97110 THERAPEUTIC EXERCISES: CPT

## 2019-01-11 PROCEDURE — 97140 MANUAL THERAPY 1/> REGIONS: CPT

## 2019-01-11 NOTE — PROGRESS NOTES
PT DAILY TREATMENT NOTE 10-18 Patient Name: Sandi Eddy Date:2019 : 1945 [x]  Patient  Verified Payor: [place] MEDICARE / Plan: Darrell Mendenhall y / Product Type: Medicare / In time:8:57  Out time:9:43 Total Treatment Time (min): 46 Visit #: 1 of 10 (MD note signed) Medicare/BCBS Only Total Timed Codes (min):  46 1:1 Treatment Time:  40 Treatment Area: Pain in right shoulder [M25.511] SUBJECTIVE Pain Level (0-10 scale): 0 Any medication changes, allergies to medications, adverse drug reactions, diagnosis change, or new procedure performed?: [x] No    [] Yes (see summary sheet for update) Subjective functional status/changes:   [] No changes reported States it is feeling extra tight today , she lifted up bowls/ measuring cups and has felt a little bit of pain in her biceps since then OBJECTIVE 36 min Therapeutic Exercise:  [x] See flow sheet :  
Rationale: increase ROM, increase strength, improve coordination and increase proprioception to improve the patients ability to perform daily household chores. 10 min Manual Therapy:  DTM right bicepts tendon, PROM flexion. Abd. IR/ ER LAD with oscillation , PROM stretch adduction across body Rationale: decrease pain, increase ROM and increase tissue extensibility to ease with overhead activities With 
 [] TE 
 [] TA 
 [] neuro 
 [] other: Patient Education: [x] Review HEP [] Progressed/Changed HEP based on:  
[] positioning   [] body mechanics   [] transfers   [] heat/ice application   
[] other:   
 
Other Objective/Functional Measures:  
[place] used for strengthening today, good tolerance and proper form Pain Level (0-10 scale) post treatment: 0 
 
ASSESSMENT/Changes in Function: Patient continues to show improvements with signs/ symptoms however still demonstrates a decrease in strength, impaired ROM,  and an increase in pain with functional activities. Patient will continue to benefit from skilled PT services to modify and progress therapeutic interventions, address functional mobility deficits, address strength deficits, analyze and cue movement patterns and analyze and modify body mechanics/ergonomics to attain remaining goals. [x]  See Plan of Care 
[]  See progress note/recertification 
[]  See Discharge Summary Progress towards goals / Updated goals: 
Updated Goals to be accomplished in 10 treatments: 
         4 patient will have FOTO 72 to show improving function with household chores             EF 23 
            UMUIZDP  
            2 patient will have gentle MMT Right shoulder 4 to aid with increase tolerance to caring for grand child 
            PN NA 
            CURRENT   
            3 Patient will have AAROM-AROM  Right shoulder F/ and IR HBB to LS region  to aid with increase tolerance to ADLS and activities for home. 
            PN Flexion 140    abd 130, ER 80  IR to hip 
            CURRENT  
 
 
 
PLAN [x]  Upgrade activities as tolerated     [x]  Continue plan of care 
[]  Update interventions per flow sheet      
[]  Discharge due to:_ 
[]  Other:_ Jamel Ritchie, PT 1/11/2019  7:39 AM 
 
Future Appointments Date Time Provider [place] 1/11/2019  9:00 AM Sol Savage, PT [facility]  
1/15/2019  9:00 AM Sol Savage PT [facility]  
1/17/2019  8:30 AM Sol Savage, PT [facility]  
1/22/2019  9:00 AM Sol Savage PT [facility]  
1/24/2019  8:30 AM Sol Savage PT [facility]  
1/29/2019  8:30 AM Lauri Lees PT [facility]  
1/31/2019  9:00 AM Sol Savage PT [facility]

## 2019-01-15 ENCOUNTER — HOSPITAL ENCOUNTER (OUTPATIENT)
Dept: PHYSICAL THERAPY | Age: 74
Discharge: HOME | End: 2019-01-15
Payer: MEDICARE

## 2019-01-15 PROCEDURE — 97110 THERAPEUTIC EXERCISES: CPT

## 2019-01-15 PROCEDURE — 97140 MANUAL THERAPY 1/> REGIONS: CPT

## 2019-01-15 NOTE — PROGRESS NOTES
PT DAILY TREATMENT NOTE 10-18 Patient Name: Javier Andre Date:1/15/2019 : 1945 [x]  Patient  Verified Payor: [place] MEDICARE / Plan: Darrell Naranjo / Product Type: Medicare / In time:8:55 Out time:9:54 Total Treatment Time (min): 59 Visit #: 2 of 10 Medicare/BCBS Only Total Timed Codes (min):  49 1:1 Treatment Time:  49  
 
 
Treatment Area: Pain in right shoulder [M25.511] SUBJECTIVE Pain Level (0-10 scale): 0 Any medication changes, allergies to medications, adverse drug reactions, diagnosis change, or new procedure performed?: [x] No    [] Yes (see summary sheet for update) Subjective functional status/changes:   [] No changes reported States she is still having pain with reaching behind her back and across her chest  
 
OBJECTIVE Modality rationale: decrease edema, decrease inflammation and decrease pain to improve the patients ability to ease with tolerance to ADLs Min Type Additional Details  
 [] Estim:  []Unatt       []IFC  []Premod []Other:  []w/ice   []w/heat Position: Location:  
 [] Estim: []Att    []TENS instruct  []NMES []Other:  []w/US   []w/ice   []w/heat Position: Location:  
 []  Traction: [] Cervical       []Lumbar 
                     [] Prone          []Supine []Intermittent   []Continuous Lbs: 
[] before manual 
[] after manual  
 []  Ultrasound: []Continuous   [] Pulsed []1MHz   []3MHz W/cm2: 
Location:  
 []  Iontophoresis with dexamethasone Location: [] Take home patch  
[] In clini c  
10 [x]  Ice     []  heat 
[]  Ice massage 
[]  Laser  
[]  Anodyne Position: incline Location: right shoulder  
 []  Laser with stim 
[]  Other:  Position: Location:  
 []  Vasopneumatic Device Pressure:       [] lo [] med [] hi  
Temperature: [] lo [] med [] hi  
[] Skin assessment post-treatment:  []intact []redness- no adverse reaction []redness  adverse reaction:  
 
 
37 min Therapeutic Exercise:  [x] See flow sheet :  
Rationale: increase ROM, increase strength and increase proprioception to improve the patients ability to perform daily household chores 12 min Manual Therapy:  PROM with overstretch flexion/ abd / IR/ ER, LAD with oscillation, manual adduction stretch, STM/DTM biceps tendon Rationale: increase ROM and increase tissue extensibility to assist with overhead activities With 
 [] TE 
 [] TA 
 [] neuro 
 [] other: Patient Education: [x] Review HEP [] Progressed/Changed HEP based on:  
[] positioning   [] body mechanics   [] transfers   [] heat/ice application   
[] other:   
 
Other Objective/Functional Measures: Tolerated all therex well Added triceps on gloria - good tolerance , increased weight for bicep curls Require cueing for form with IR/ER Pain Level (0-10 scale) post treatment: 1.5 ASSESSMENT/Changes in Function: Patient continues to show improvements with signs/ symptoms however still demonstrates a decrease in strength, impaired ROM,  and an increase in pain with functional activities. Patient will continue to benefit from skilled PT services to modify and progress therapeutic interventions, address functional mobility deficits, address ROM deficits, address strength deficits, analyze and cue movement patterns and analyze and modify body mechanics/ergonomics to attain remaining goals. [x]  See Plan of Care 
[]  See progress note/recertification 
[]  See Discharge Summary Progress towards goals / Updated goals: 
Updated Goals to be accomplished in 10 treatments: 
         0 patient will have FOTO 72 to show improving function with household chores             OR 08 
            GIMHDUZ  
            2 patient will have gentle MMT Right shoulder 4 to aid with increase tolerance to caring for grand child 
            PN NA 
            CURRENT   
             3 Patient will have AAROM-AROM  Right shoulder F/ and IR HBB to LS region  to aid with increase tolerance to ADLS and activities for home. 
            PN Flexion 140    abd 130, ER 80  IR to hip 
            CURRENT  
 
 
 
PLAN [x]  Upgrade activities as tolerated     [x]  Continue plan of care 
[]  Update interventions per flow sheet      
[]  Discharge due to:_ 
[]  Other:_ Sandra Singh, PT 1/15/2019  7:43 AM 
 
Future Appointments Date Time Provider [place] 1/15/2019  9:00 AM Vickey Ann, PT [facility]  
1/17/2019  8:30 AM Vickey Ann PT [facility]  
1/22/2019  9:00 AM Vickey Ann PT [facility]  
1/24/2019  8:30 AM Vickey Ann PT [facility]  
1/29/2019  8:30 AM Luis Pacheco PT [facility]  
1/31/2019  9:00 AM Vickey Ann PT [facility]

## 2019-01-17 ENCOUNTER — HOSPITAL ENCOUNTER (OUTPATIENT)
Dept: PHYSICAL THERAPY | Age: 74
Discharge: HOME | End: 2019-01-17
Payer: MEDICARE

## 2019-01-17 PROCEDURE — 97110 THERAPEUTIC EXERCISES: CPT

## 2019-01-17 PROCEDURE — 97140 MANUAL THERAPY 1/> REGIONS: CPT

## 2019-01-17 NOTE — PROGRESS NOTES
PT DAILY TREATMENT NOTE 10-18 Patient Name: Yaa Postin Date:2019 : 1945 [x]  Patient  Verified Payor: [place] MEDICARE / Plan: Darrell Naranjo / Product Type: Medicare / In time:8:34  Out time:9:15 Total Treatment Time (min): 41 Visit #: 3 of 10 Medicare/BCBS Only Total Timed Codes (min):  41 1:1 Treatment Time:  30 Treatment Area: Pain in right shoulder [M25.511] SUBJECTIVE Pain Level (0-10 scale): 0 Any medication changes, allergies to medications, adverse drug reactions, diagnosis change, or new procedure performed?: [x] No    [] Yes (see summary sheet for update) Subjective functional status/changes:   [] No changes reported States she is just feeling stiff today OBJECTIVE 31 min Therapeutic Exercise:  [x] See flow sheet :  
Rationale: increase ROM, increase strength and increase proprioception to improve the patients ability to perform daily household chores. 10 min Manual Therapy:  PROM with overpressure, flexion/ abd/ IR/ ER, STM/DTM biceps tendon Rationale: decrease pain, increase ROM and increase tissue extensibility to ease with overhead activities With 
 [] TE 
 [] TA 
 [] neuro 
 [] other: Patient Education: [x] Review HEP [] Progressed/Changed HEP based on:  
[] positioning   [] body mechanics   [] transfers   [] heat/ice application   
[] other:   
 
Other Objective/Functional Measures:  
Demonstrated doorway stretch for biceps Reports good tolerance and will continue at home Pain Level (0-10 scale) post treatment: 0 
 
ASSESSMENT/Changes in Function: Patient continues to show improvements with signs/ symptoms however still demonstrates a decrease in strength, impaired ROM, deficits with balance and an increase in pain with functional activities.   
 
 
Patient will continue to benefit from skilled PT services to modify and progress therapeutic interventions, address functional mobility deficits, address ROM deficits, address strength deficits, analyze and cue movement patterns and analyze and modify body mechanics/ergonomics to attain remaining goals. [x]  See Plan of Care 
[]  See progress note/recertification 
[]  See Discharge Summary Progress towards goals / Updated goals: 
Updated Goals to be accomplished in 10 treatments: 
         1 patient will have FOTO 72 to show improving function with household chores             EB 65 
            S[place]  
            2 patient will have gentle MMT Right shoulder 4 to aid with increase tolerance to caring for grand child 
            PN NA 
            CURRENT   
            3 Patient will have AAROM-AROM  Right shoulder F/ and IR HBB to LS region  to aid with increase tolerance to ADLS and activities for home. 
            PN Flexion 140    abd 130, ER 80  IR to hip 
            CURRENT  
  
 
 
 
PLAN [x]  Upgrade activities as tolerated     [x]  Continue plan of care 
[]  Update interventions per flow sheet      
[]  Discharge due to:_ 
[]  Other:_ Kwame Herrera, PT 1/17/2019  7:43 AM 
 
Future Appointments Date Time Provider [place] 1/17/2019  8:30 AM Anthony Rodriguez PT [facility]  
1/22/2019  9:00 AM Anthony Rodriguez PT [facility]  
1/24/2019  8:30 AM Anthony Rodriguez PT [facility]  
1/29/2019  8:30 AM You Pinzon PT [facility]  
1/31/2019  9:00 AM Anthony Rodriguez PT [facility]

## 2019-01-22 ENCOUNTER — HOSPITAL ENCOUNTER (OUTPATIENT)
Dept: PHYSICAL THERAPY | Age: 74
Discharge: HOME | End: 2019-01-22
Payer: MEDICARE

## 2019-01-22 PROCEDURE — 97110 THERAPEUTIC EXERCISES: CPT

## 2019-01-22 PROCEDURE — 97140 MANUAL THERAPY 1/> REGIONS: CPT

## 2019-01-22 NOTE — PROGRESS NOTES
PT DAILY TREATMENT NOTE 10-18 Patient Name: Steven Navarro Date:2019 : 1945 [x]  Patient  Verified Payor: [place] MEDICARE / Plan: Darrell Senjoe / Product Type: Medicare / In time:8:49  Out time:9:35 Total Treatment Time (min): 46 Visit #: 4 of 10 Medicare/BCBS Only Total Timed Codes (min):  46 1:1 Treatment Time:  46  
 
 
Treatment Area: Pain in right shoulder [M25.511] SUBJECTIVE Pain Level (0-10 scale): 2 Any medication changes, allergies to medications, adverse drug reactions, diagnosis change, or new procedure performed?: [x] No    [] Yes (see summary sheet for update) Subjective functional status/changes:   [] No changes reported States on Saturday her biceps was very sore, she used a lot of ice however it is still sore today (Tuesday) OBJECTIVE 34 min Therapeutic Exercise:  [x] See flow sheet :  
Rationale: increase ROM, increase strength and increase proprioception to improve the patients ability to perform daily household chores. 12 min Manual Therapy:  PROM with overpressure flexion/ abd, DTM/STM biceps tendon , LAD with oscillation Rationale: decrease pain, increase ROM and increase tissue extensibility to assist with overhead activities With 
 [] TE 
 [] TA 
 [] neuro 
 [] other: Patient Education: [x] Review HEP [] Progressed/Changed HEP based on:  
[] positioning   [] body mechanics   [] transfers   [] heat/ice application   
[] other:   
 
Other Objective/Functional Measures: Tolerated all therex well with no changes in symptoms Did not perform shoulder abduction due to pain at this time Pain Level (0-10 scale) post treatment: <2 
 
ASSESSMENT/Changes in Function: Patient continues to show improvements with signs/ symptoms however still demonstrates a decrease in strength, impaired ROM,  and an increase in pain with functional activities. Patient will continue to benefit from skilled PT services to modify and progress therapeutic interventions, address functional mobility deficits, address ROM deficits, address strength deficits, analyze and cue movement patterns and analyze and modify body mechanics/ergonomics to attain remaining goals. [x]  See Plan of Care 
[]  See progress note/recertification 
[]  See Discharge Summary Progress towards goals / Updated goals: 
Updated Goals to be accomplished in 10 treatments: 
         0 patient will have FOTO 72 to show improving function with household chores             IY 40 
            PJEMUVS  
            2 patient will have gentle MMT Right shoulder 4 to aid with increase tolerance to caring for grand child 
            PN NA 
            CURRENT   
            3 Patient will have AAROM-AROM  Right shoulder F/ and IR HBB to LS region  to aid with increase tolerance to ADLS and activities for home. 
            PN Flexion 140    abd 130, ER 80  IR to hip 
            CURRENT  
 
 
 
PLAN [x]  Upgrade activities as tolerated     [x]  Continue plan of care 
[]  Update interventions per flow sheet      
[]  Discharge due to:_ 
[]  Other:_ Zainab Arauz, PT 1/22/2019  7:41 AM 
 
Future Appointments Date Time Provider [place] 1/22/2019  9:00 AM Gustavo Martin, PT [facility]  
1/24/2019  8:30 AM Gustavo Martin PT [facility]  
1/29/2019  8:30 AM Gautam Gutierrez, PT [facility]  
1/31/2019  9:00 AM Gustavo Martin PT [facility]

## 2019-01-24 ENCOUNTER — HOSPITAL ENCOUNTER (OUTPATIENT)
Dept: PHYSICAL THERAPY | Age: 74
Discharge: HOME | End: 2019-01-24
Payer: MEDICARE

## 2019-01-24 PROCEDURE — 97140 MANUAL THERAPY 1/> REGIONS: CPT

## 2019-01-24 PROCEDURE — G8984 CARRY CURRENT STATUS: HCPCS

## 2019-01-24 PROCEDURE — G8985 CARRY GOAL STATUS: HCPCS

## 2019-01-24 PROCEDURE — 97110 THERAPEUTIC EXERCISES: CPT

## 2019-01-24 NOTE — PROGRESS NOTES
PT DAILY TREATMENT NOTE 10-18 Patient Name: Luanne Johnston Date:2019 : 1945 [x]  Patient  Verified Payor: [place] MEDICARE / Plan: Darrell Naranjo / Product Type: Medicare / In time:8:28  Out time:9:12 Total Treatment Time (min): 44 Visit #: 5 of 10 Medicare/BCBS Only Total Timed Codes (min):  44 1:1 Treatment Time:  30 Treatment Area: Pain in right shoulder [M25.511] SUBJECTIVE Pain Level (0-10 scale): 2 Any medication changes, allergies to medications, adverse drug reactions, diagnosis change, or new procedure performed?: [x] No    [] Yes (see summary sheet for update) Subjective functional status/changes:   [] No changes reported States she is still sore in her bicep today OBJECTIVE 32 min Therapeutic Exercise:  [x] See flow sheet :  
Rationale: increase ROM, increase strength and increase proprioception to improve the patients ability to perform daily household chores. 12 min Manual Therapy:  PROM with overpressure flexion/ abd/ IR/ ER, LAD with oscillation, DTM biceps tendon Rationale: decrease pain and increase ROM to ease overhead activities such as cooking/ cleaning With 
 [] TE 
 [] TA 
 [] neuro 
 [] other: Patient Education: [x] Review HEP [] Progressed/Changed HEP based on:  
[] positioning   [] body mechanics   [] transfers   [] heat/ice application   
[] other:   
 
Other Objective/Functional Measures: Tolerated all therex well Require cueing throughout for proper form with gloria therex Pain Level (0-10 scale) post treatment: <2 
 
ASSESSMENT/Changes in Function: Patient continues to show improvements with signs/ symptoms however still demonstrates a decrease in strength, impaired ROM,  and an increase in pain with functional activities.   
 
 
Patient will continue to benefit from skilled PT services to modify and progress therapeutic interventions, address functional mobility deficits, address ROM deficits, address strength deficits, analyze and cue movement patterns and analyze and modify body mechanics/ergonomics to attain remaining goals. [x]  See Plan of Care 
[]  See progress note/recertification 
[]  See Discharge Summary Progress towards goals / Updated goals: 
Updated Goals to be accomplished in 10 treatments: 
         7 patient will have FOTO 72 to show improving function with household chores             OZ 55 
            NFHSFTT  
            2 patient will have gentle MMT Right shoulder 4 to aid with increase tolerance to caring for grand child 
            PN NA 
            CURRENT   
            3 Patient will have AAROM-AROM  Right shoulder F/ and IR HBB to LS region  to aid with increase tolerance to ADLS and activities for home. 
            PN Flexion 140    abd 130, ER 80  IR to hip 
            CURRENT  
  
 
 
 
PLAN [x]  Upgrade activities as tolerated     [x]  Continue plan of care 
[]  Update interventions per flow sheet      
[]  Discharge due to:_ 
[]  Other:_ Pelon Shay, PT 1/24/2019  7:43 AM 
 
Future Appointments Date Time Provider [place] 1/24/2019  8:30 AM Logan Monroy PT [facility]  
1/29/2019  8:30 AM Edgar De Leon PT [facility]  
1/31/2019  9:00 AM Logan Monroy PT [facility]

## 2019-01-24 NOTE — PROGRESS NOTES
In [place] [address], [address] [place], [address] [place] [address],[facility] 300 [phone] fax Medicare Progress Report Patient name: Zachary Valentine Start of Care: 10/26/18 Referral source: Daisha Alves : 1945 Medical/Treatment Diagnosis: Pain in right shoulder [M25.511] Payor: Lucy Clark / Plan: [place] MEDICARE PART A & B / Product Type: Medicare /  Onset Date:2017, P/O 18 
   
Prior Hospitalization: see medical history Provider#: [id number] Medications: Verified on Patient Summary List    
Comorbidities: none 
 Prior Level of Function: I all areas of activities, ADLS, Yoga, retired, household chores, community activities 
  
 
Visits from [facility]: 22    Missed Visits: 0 Reporting Period: 10/26/18 to 19 Subjective Reports: Everything is feeling much better except for that muscle on the front of my arm Key functional changes: increased AROM, increased strength Problems/ barriers to goal attainment: none Assessment / Recommendations:Patient has shown significant improvements in strength, AROM and activity tolerance since beginning therapy. She is currently able to perform an average of 35 minutes of exercises each session with minimal cueing for proper form. Patient continues to demonstrate difficulties with pain control around biceps tendon, reaching overhead and behind her back, and has difficulties with lifting heavy objects. Recommend continuation of physical therapy at this time to address remaining impairments and establish independence with exercise program.  
 
 
Problem List: pain affecting function, decrease ROM, decrease strength, decrease ADL/ functional abilitiies, decrease activity tolerance and decrease flexibility/ joint mobility Treatment Plan: Therapeutic exercise, Therapeutic activities, Neuromuscular re-education, Physical agent/modality, Gait/balance training, Manual therapy, Patient education, Self Care training, Functional mobility training, Home safety training and Stair training Patient Goal (s) has been updated and includes: Get rid of the pain in the front of my arm Updated Goals to be accomplished in 10 treatments: 
         1 patient will have FOTO 72 to show improving function with household chores             ZN 82 
            YXOJBCP :  
            0 patient will have gentle MMT Right shoulder 4 to aid with increase tolerance to caring for grand child 
            PN NA 
            CURRENT : Progressing Flexion 4-/5, abduction 4-/5, ER 5/5, IR 4-/5  
            3 Patient will have AAROM-AROM  Right shoulder F/ and IR HBB to LS region  to aid with increase tolerance to ADLS and activities for home. 
            PN Flexion 140    abd 130, ER 80  IR to hip 
            CURRENT : Progressing  Flexion 143, abduction 134 Frequency / Duration: Patient to be seen 2 times per week for 10 treatments: 
 
G: CODES:    
Pam Frances CURRENT:     
 CJ 20-39% GOAL:   
 CK  40-59% Sushil Gutierres, PT 1/24/2019 7:52 AM

## 2019-01-29 ENCOUNTER — HOSPITAL ENCOUNTER (OUTPATIENT)
Dept: PHYSICAL THERAPY | Age: 74
Discharge: HOME | End: 2019-01-29
Payer: MEDICARE

## 2019-01-29 PROCEDURE — 97110 THERAPEUTIC EXERCISES: CPT

## 2019-01-29 PROCEDURE — 97140 MANUAL THERAPY 1/> REGIONS: CPT

## 2019-01-29 NOTE — PROGRESS NOTES
PT DAILY TREATMENT NOTE 10-18 Patient Name: Wandy Kraft Date:2019 : 1945 [x]  Patient  Verified Payor: [place] MEDICARE / Plan: Darrell Mendenhall y / Product Type: Medicare / In time:821  Out time:906 Total Treatment Time (min): 38 Visit #: 1 of 10 from medicare progress note Medicare/BCBS Only Total Timed Codes (min):  38 1:1 Treatment Time:  45 Treatment Area: Pain in right shoulder [M25.511] SUBJECTIVE Pain Level (0-10 scale): 0 Any medication changes, allergies to medications, adverse drug reactions, diagnosis change, or new procedure performed?: [x] No    [] Yes (see summary sheet for update) Subjective functional status/changes:   [] No changes reported I think I may have slept on it wrong, I am a little sore OBJECTIVE Modality rationale:    
Min Type Additional Details  
 [] Estim:  []Unatt       []IFC  []Premod []Other:  []w/ice   []w/heat Position: Location:  
 [] Estim: []Att    []TENS instruct  []NMES []Other:  []w/US   []w/ice   []w/heat Position: Location:  
 []  Traction: [] Cervical       []Lumbar 
                     [] Prone          []Supine []Intermittent   []Continuous Lbs: 
[] before manual 
[] after manual  
 []  Ultrasound: []Continuous   [] Pulsed []1MHz   []3MHz W/cm2: 
Location:  
 []  Iontophoresis with dexamethasone Location: [] Take home patch  
[] In clinic  
 []  Ice     []  heat 
[]  Ice massage 
[]  Laser  
[]  Anodyne Position: Location:  
 []  Laser with stim 
[]  Other:  Position: Location:  
 []  Vasopneumatic Device Pressure:       [] lo [] med [] hi  
Temperature: [] lo [] med [] hi  
[] Skin assessment post-treatment:  []intact []redness- no adverse reaction 
  []redness  adverse reaction:  
 
 min []Eval                  []Re-Eval  
 
 
25 min Therapeutic Exercise:  [x] See flow sheet :  
 Rationale: increase ROM, increase strength, improve coordination and increase proprioception to improve the patients ability to aid with increase tolerance to ADLs and activities 
 
 min Therapeutic Activity:  []  See flow sheet :  
Rationale:   to improve the patients ability to  
  
 min Neuromuscular Re-education:  []  See flow sheet :  
Rationale:   to improve the patients ability to  
 
13 min Manual Therapy:  LAD Oscill, Mobs for IR in supine/reclined Rationale: increase ROM, increase tissue extensibility and decrease trigger points to aid with increase tolerance to ADLS and activities 
 
  min Gait Training:  ___ feet with ___ device on level surfaces with ___ level of assist  
Rationale: With 
 [] TE 
 [] TA 
 [] neuro 
 [] other: Patient Education: [x] Review HEP [] Progressed/Changed HEP based on:  
[] positioning   [] body mechanics   [] transfers   [] heat/ice application   
[] other:   
 
Other Objective/Functional Measures: VC exercises and technique Pain Level (0-10 scale) post treatment: 0 
 
ASSESSMENT/Changes in Function: T[facility] at [facility] right shoulder, difficulty tolerating hand placement for mobs Patient will continue to benefit from skilled PT services to modify and progress therapeutic interventions, address ROM deficits, address strength deficits, analyze and address soft tissue restrictions, analyze and cue movement patterns, analyze and modify body mechanics/ergonomics, assess and modify postural abnormalities and instruct in home and community integration to attain remaining goals. [x]  See Plan of Care [x]  See progress note/recertification 
[]  See Discharge Summary Progress towards goals / Updated goals: 
Updated Goals to be accomplished in 10 treatments: 
         8 patient will have FOTO 72 to show improving function with household chores             PN 65 
            CURRENT :  
            2 patient will have gentle MMT Right shoulder 4 to aid with increase tolerance to caring for grand child 
            PN NA 
            CURRENT : Progressing Flexion 4-/5, abduction 4-/5, ER 5/5, IR 4-/5  
            3 Patient will have AAROM-AROM  Right shoulder F/ and IR HBB to LS region  to aid with increase tolerance to ADLS and activities for home. 
            PN Flexion 140    abd 130, ER 80  IR to hip 
            CURRENT : Progressing  Flexion 143, abduction 134 PLAN [x]  Upgrade activities as tolerated     [x]  Continue plan of care 
[]  Update interventions per flow sheet      
[]  Discharge due to:_ 
[]  Other:_ Marlyn Barksdale PT 1/29/2019  8:23 AM 
 
Future Appointments Date Time Provider [place] 1/29/2019  8:30 AM Radha Barksdale, PT [facility]  
1/31/2019  9:00 AM Frankey Sheets, PT [facility]

## 2019-01-31 ENCOUNTER — HOSPITAL ENCOUNTER (OUTPATIENT)
Dept: PHYSICAL THERAPY | Age: 74
Discharge: HOME | End: 2019-01-31
Payer: MEDICARE

## 2019-01-31 PROCEDURE — 97140 MANUAL THERAPY 1/> REGIONS: CPT

## 2019-01-31 PROCEDURE — 97110 THERAPEUTIC EXERCISES: CPT

## 2019-01-31 NOTE — PROGRESS NOTES
PT DAILY TREATMENT NOTE 10-18 Patient Name: Jamin Morales Date:2019 : 1945 [x]  Patient  Verified Payor: VA MEDICARE / Plan: Darrell Naranjo / Product Type: Medicare / In time:9:02  Out time:10:01 Total Treatment Time (min): 59 Visit #: 2 of 10 Medicare/BCBS Only Total Timed Codes (min):  49 1:1 Treatment Time:  49  
 
 
Treatment Area: Pain in right shoulder [M25.511] SUBJECTIVE Pain Level (0-10 scale): 0 Any medication changes, allergies to medications, adverse drug reactions, diagnosis change, or new procedure performed?: [x] No    [] Yes (see summary sheet for update) Subjective functional status/changes:   [] No changes reported States she is a little sore from scrubbing her kitchen but no real pain OBJECTIVE Modality rationale: decrease edema, decrease inflammation and decrease pain to improve the patients ability to ease with tolerance to ADLs Type Additional Details  
[] Estim:  []Unatt       []IFC  []Premod []Other:  []w/ice   []w/heat Position: Location:  
[] Estim: []Att    []TENS instruct  []NMES []Other:  []w/US   []w/ice   []w/heat Position: Location:  
[]  Traction: [] Cervical       []Lumbar 
                     [] Prone          []Supine []Intermittent   []Continuous Lbs: 
[] before manual 
[] after manual  
[]  Ultrasound: []Continuous   [] Pulsed []1MHz   []3MHz W/cm2: 
Location:  
[]  Iontophoresis with dexamethasone Location: [] Take home patch  
[] In clinic [x]  Ice     []  heat 
[]  Ice massage 
[]  Laser  
[]  Anodyne Position: incline Location: right shoulder  
[]  Laser with stim 
[]  Other:  Position: Location:  
[]  Vasopneumatic Device Pressure:       [] lo [] med [] hi  
Temperature: [] lo [] med [] hi  
[] Skin assessment post-treatment:  []intact []redness- no adverse reaction 
  []redness  adverse reaction: 37 min Therapeutic Exercise:  [x] See flow sheet :  
Rationale: increase ROM, increase strength and increase proprioception to improve the patients ability to perform daily household chores. 12 min Manual Therapy:  PROM with overpressure flexion/ abduction/ IR/ ER, LAD with oscillation , DTM right biceps tendon Rationale: decrease pain, increase ROM and increase tissue extensibility to assisst with overhead activities such as cleaning and cooking With 
 [] TE 
 [] TA 
 [] neuro 
 [] other: Patient Education: [x] Review HEP [] Progressed/Changed HEP based on:  
[] positioning   [] body mechanics   [] transfers   [] heat/ice application   
[] other:   
 
Other Objective/Functional Measures: Tolerated all therex well - no changes in symptoms Increased reps for all gloria exercises with reports of muscle fatigue  - no increases in pain Pain Level (0-10 scale) post treatment: 0 
 
ASSESSMENT/Changes in Function: Patient continues to show improvements with signs/ symptoms however still demonstrates a decrease in strength, impaired ROM, and an increase in pain with functional activities. Patient will continue to benefit from skilled PT services to modify and progress therapeutic interventions, address functional mobility deficits, address strength deficits, analyze and cue movement patterns and analyze and modify body mechanics/ergonomics to attain remaining goals. [x]  See Plan of Care 
[]  See progress note/recertification 
[]  See Discharge Summary Progress towards goals / Updated goals: 
Updated Goals to be accomplished in 10 treatments: 
         8 patient will have FOTO 72 to show improving function with household chores             SI 21 
            ZKYKYHX :  
            2 patient will have gentle MMT Right shoulder 4 to aid with increase tolerance to caring for grand child 
            PN NA 
             CURRENT : Progressing Flexion 4-/5, abduction 4-/5, ER 5/5, IR 4-/5  
            3 Patient will have AAROM-AROM  Right shoulder F/ and IR HBB to LS region  to aid with increase tolerance to ADLS and activities for home. 
            PN Flexion 140    abd 130, ER 80  IR to hip 
            CURRENT : Progressing  Flexion 143, abduction 134 PLAN [x]  Upgrade activities as tolerated     [x]  Continue plan of care 
[]  Update interventions per flow sheet      
[]  Discharge due to:_ 
[]  Other:_ Omaira Miller, PT 1/31/2019  7:41 AM 
 
Future Appointments Date Time Provider Ana Paris 1/31/2019  9:00 AM Donna Hinton, PT [facility]  
2/5/2019  9:00 AM Donna Hinton, PT [facility]  
2/7/2019  9:00 AM Donna Hinton, PT [facility]  
2/12/2019  9:00 AM Donna Hinton, PT [facility]  
2/14/2019  9:00 AM Donna Hinton, PT [facility]  
2/19/2019  9:00 AM Donna Hinton, PT [facility]  
2/21/2019  9:00 AM Donna Hinton, PT [facility]

## 2019-02-05 ENCOUNTER — HOSPITAL ENCOUNTER (OUTPATIENT)
Dept: PHYSICAL THERAPY | Age: 74
Discharge: HOME | End: 2019-02-05
Payer: MEDICARE

## 2019-02-05 PROCEDURE — 97110 THERAPEUTIC EXERCISES: CPT

## 2019-02-05 PROCEDURE — 97140 MANUAL THERAPY 1/> REGIONS: CPT

## 2019-02-05 NOTE — PROGRESS NOTES
PT DAILY TREATMENT NOTE 10-18 Patient Name: Wandy Kraft Date:2019 : 1945 [x]  Patient  Verified Payor: VA MEDICARE / Plan: Darrell Mendenhall [facility] / Product Type: Medicare / In time:9:00  Out time:9:56 Total Treatment Time (min): 56 Visit #: 3 of 10 Medicare/BCBS Only Total Timed Codes (min):  46 1:1 Treatment Time:  46  
 
 
Treatment Area: Pain in right shoulder [M25.511] SUBJECTIVE Pain Level (0-10 scale): 0 Any medication changes, allergies to medications, adverse drug reactions, diagnosis change, or new procedure performed?: [x] No    [] Yes (see summary sheet for update) Subjective functional status/changes:   [] No changes reported States that she over did it on Saturday cleaning , used ice and it feels better today OBJECTIVE Modality rationale: decrease edema, decrease inflammation and decrease pain to improve the patients ability to ease with tolerance to ADLs Min Type Additional Details  
 [] Estim:  []Unatt       []IFC  []Premod []Other:  []w/ice   []w/heat Position: Location:  
 [] Estim: []Att    []TENS instruct  []NMES []Other:  []w/US   []w/ice   []w/heat Position: Location:  
 []  Traction: [] Cervical       []Lumbar 
                     [] Prone          []Supine []Intermittent   []Continuous Lbs: 
[] before manual 
[] after manual  
 []  Ultrasound: []Continuous   [] Pulsed []1MHz   []3MHz W/cm2: 
Location:  
 []  Iontophoresis with dexamethasone Location: [] Take home patch  
[] In clinic  
10 [x]  Ice     []  heat 
[]  Ice massage 
[]  Laser  
[]  Anodyne Position: incline Location: right shoulder  
 []  Laser with stim 
[]  Other:  Position: Location:  
 []  Vasopneumatic Device Pressure:       [] lo [] med [] hi  
Temperature: [] lo [] med [] hi  
[] Skin assessment post-treatment:  []intact []redness- no adverse reaction []redness  adverse reaction:  
 
  
34 min Therapeutic Exercise:  [x] See flow sheet :  
Rationale: increase ROM, increase strength and increase proprioception to improve the patients ability to perform daily household chores. 12 min Manual Therapy:  PROM with overpressure flexion/ abd/ IR/ ER, LAD with oscillation, DTM bicep tendon Rationale: decrease pain, increase ROM and increase tissue extensibility to ease with cleaning/ cooking With 
 [] TE 
 [] TA 
 [] neuro 
 [] other: Patient Education: [x] Review HEP [] Progressed/Changed HEP based on:  
[] positioning   [] body mechanics   [] transfers   [] heat/ice application   
[] other:   
 
Other Objective/Functional Measures:  
Increased resistance for gloria exercises- good tolerance Reports of fatigue after therex Pain Level (0-10 scale) post treatment: 0 
 
ASSESSMENT/Changes in Function: Patient continues to show improvements with signs/ symptoms however still demonstrates a decrease in strength, impaired ROM,  and an increase in pain with functional activities. Patient will continue to benefit from skilled PT services to modify and progress therapeutic interventions, address functional mobility deficits, address strength deficits, analyze and cue movement patterns and analyze and modify body mechanics/ergonomics to attain remaining goals. [x]  See Plan of Care 
[]  See progress note/recertification 
[]  See Discharge Summary Progress towards goals / Updated goals: 
Updated Goals to be accomplished in 10 treatments: 
         0 patient will have FOTO 72 to show improving function with household chores             WG 79 
            TKYNZVW :  
            2 patient will have gentle MMT Right shoulder 4 to aid with increase tolerance to caring for grand child 
            PN NA 
            CURRENT : Progressing Flexion 4-/5, abduction 4-/5, ER 5/5, IR 4-/5  
             3 Patient will have AAROM-AROM  Right shoulder F/ and IR HBB to LS region  to aid with increase tolerance to ADLS and activities for home. 
            PN Flexion 140    abd 130, ER 80  IR to hip 
            CURRENT : Progressing  Flexion 143, abduction 134 PLAN [x]  Upgrade activities as tolerated     [x]  Continue plan of care 
[]  Update interventions per flow sheet      
[]  Discharge due to:_ 
[]  Other:_ Chandra Shabazz PT 2/5/2019  7:38 AM 
 
Future Appointments Date Time Provider Ana Paris 2/5/2019  9:00 AM Kanwal Mercedes, PT [facility]  
2/7/2019  9:00 AM Kanwal Mercedes PT [facility]  
2/12/2019  9:00 AM Kanwal Mercedes, PT [facility]  
2/14/2019  9:00 AM Kanwal Mercedes PT [facility]  
2/19/2019  9:00 AM Kanwal Mercedes PT [facility]  
2/21/2019  9:00 AM Kanwal Mercedes PT [facility]

## 2019-02-07 ENCOUNTER — HOSPITAL ENCOUNTER (OUTPATIENT)
Dept: PHYSICAL THERAPY | Age: 74
Discharge: HOME | End: 2019-02-07
Payer: MEDICARE

## 2019-02-07 PROCEDURE — 97110 THERAPEUTIC EXERCISES: CPT

## 2019-02-07 PROCEDURE — 97140 MANUAL THERAPY 1/> REGIONS: CPT

## 2019-02-07 NOTE — PROGRESS NOTES
PT DAILY TREATMENT NOTE 10-18 Patient Name: Michael Hahn Date:2019 : 1945 [x]  Patient  Verified Payor: [place] MEDICARE / Plan: Darrell Naranjo / Product Type: Medicare / In time:9:03  Out time:9:46 Total Treatment Time (min): 43 Visit #: 4 of 10 Medicare/BCBS Only Total Timed Codes (min):  43 1:1 Treatment Time:  30 Treatment Area: Pain in right shoulder [M25.511] SUBJECTIVE Pain Level (0-10 scale): \"tender\" Any medication changes, allergies to medications, adverse drug reactions, diagnosis change, or new procedure performed?: [x] No    [] Yes (see summary sheet for update) Subjective functional status/changes:   [] No changes reported Reports that she was cleaning a lot OBJECTIVE 33 min Therapeutic Exercise:  [x] See flow sheet :  
Rationale: increase ROM, increase strength and increase proprioception to improve the patients ability to perform daily household chores. 10 min Manual Therapy:  DTM/STM biceps tendon, PROM with overpressure flexion/ abd/ IR/ ER Rationale: decrease pain, increase tissue extensibility and decrease trigger points to assist with daily functional activities such as cleaning/ cooking With 
 [] TE 
 [] TA 
 [] neuro 
 [] other: Patient Education: [x] Review HEP [] Progressed/Changed HEP based on:  
[] positioning   [] body mechanics   [] transfers   [] heat/ice application   
[] other:   
 
Other Objective/Functional Measures: Tolerated all therex well- no changes in symptoms Reports of muscle fatigue during exercises- good form noted Cueing with IR/ ER for proper form Pain Level (0-10 scale) post treatment: \"tender\" ASSESSMENT/Changes in Function: Patient continues to show improvements with signs/ symptoms however still demonstrates a decrease in strength, impaired ROM, and an increase in pain with functional activities. Patient will continue to benefit from skilled PT services to modify and progress therapeutic interventions, address functional mobility deficits, address strength deficits, analyze and cue movement patterns and analyze and modify body mechanics/ergonomics to attain remaining goals. [x]  See Plan of Care 
[]  See progress note/recertification 
[]  See Discharge Summary Progress towards goals / Updated goals: 
Updated Goals to be accomplished in 10 treatments: 
         6 patient will have FOTO 72 to show improving function with household chores             SB 19 
            CURRENT :  
            2 patient will have gentle MMT Right shoulder 4 to aid with increase tolerance to caring for grand child 
            PN NA 
            CURRENT : Progressing Flexion 4-/5, abduction 4-/5, ER 5/5, IR 4-/5  
            3 Patient will have AAROM-AROM  Right shoulder F/ and IR HBB to LS region  to aid with increase tolerance to ADLS and activities for home. 
            PN Flexion 140    abd 130, ER 80  IR to hip 
            CURRENT : Progressing  Flexion 143, abduction 134 PLAN [x]  Upgrade activities as tolerated     [x]  Continue plan of care 
[]  Update interventions per flow sheet      
[]  Discharge due to:_ 
[]  Other:_ Fercho Persaud PT 2/7/2019  7:42 AM 
 
Future Appointments Date Time Provider Ana Paris 2/7/2019  9:00 AM Zully Santana PT [facility]  
2/12/2019  9:00 AM Zully Santana, PT [facility]  
2/14/2019  9:00 AM Zully Santana PT [facility]  
2/19/2019  9:00 AM Zully Santana PT [facility]  
2/21/2019  9:00 AM Zully Santana, PT [facility]

## 2019-02-12 ENCOUNTER — HOSPITAL ENCOUNTER (OUTPATIENT)
Dept: PHYSICAL THERAPY | Age: 74
Discharge: HOME | End: 2019-02-12
Payer: MEDICARE

## 2019-02-12 PROCEDURE — 97140 MANUAL THERAPY 1/> REGIONS: CPT

## 2019-02-12 PROCEDURE — 97110 THERAPEUTIC EXERCISES: CPT

## 2019-02-12 NOTE — PROGRESS NOTES
PT DAILY TREATMENT NOTE 10-18 Patient Name: Janee Araiza Date:2019 : 1945 [x]  Patient  Verified Payor: [place] MEDICARE / Plan: [facility] Abdirashid y / Product Type: Medicare / In time:8:57  Out time:9:49 Total Treatment Time (min): 52 Visit #: 5 of 10 Medicare/BCBS Only Total Timed Codes (min):  42 1:1 Treatment Time:  42 Treatment Area: Pain in right shoulder [M25.511] SUBJECTIVE Pain Level (0-10 scale): \"sore\" Any medication changes, allergies to medications, adverse drug reactions, diagnosis change, or new procedure performed?: [x] No    [] Yes (see summary sheet for update) Subjective functional status/changes:   [] No changes reported Patient did 7 loads of laundry yesterday and reports being sore today OBJECTIVE Modality rationale: decrease edema, decrease inflammation and decrease pain to improve the patients ability to ease with tolerance to ADLs Type Additional Details  
[] Estim:  []Unatt       []IFC  []Premod []Other:  []w/ice   []w/heat Position: Location:  
[] Estim: []Att    []TENS instruct  []NMES []Other:  []w/US   []w/ice   []w/heat Position: Location:  
[]  Traction: [] Cervical       []Lumbar 
                     [] Prone          []Supine []Intermittent   []Continuous Lbs: 
[] before manual 
[] after manual  
[]  Ultrasound: []Continuous   [] Pulsed []1MHz   []3MHz W/cm2: 
Location:  
[]  Iontophoresis with dexamethasone Location: [] Take home patch  
[] In clinic [x]  Ice     []  heat 
[]  Ice massage 
[]  Laser  
[]  Anodyne Position: incline Location: right shoulder  
[]  Laser with stim 
[]  Other:  Position: Location:  
[]  Vasopneumatic Device Pressure:       [] lo [] med [] hi  
Temperature: [] lo [] med [] hi  
[] Skin assessment post-treatment:  []intact []redness- no adverse reaction 
  []redness  adverse reaction: 32 min Therapeutic Exercise:  [x] See flow sheet :  
Rationale: increase ROM, increase strength, improve coordination and increase proprioception to improve the patients ability to perform daily household chores. 10 min Manual Therapy:  DTM/ STM biceps tendon, PROM with overpressure flexion/ abd/ IR/ ER, LAD with oscillation Rationale: decrease pain, increase ROM and increase tissue extensibility to assist with tolerance to ADLs With 
 [] TE 
 [] TA 
 [] neuro 
 [] other: Patient Education: [x] Review HEP [] Progressed/Changed HEP based on:  
[] positioning   [] body mechanics   [] transfers   [] heat/ice application   
[] other:   
 
Other Objective/Functional Measures: Tolerated all therex well- decreased weight for rows/ extension due to soreness today Plan to D/C after scheduled visits Pain Level (0-10 scale) post treatment: 0 
 
ASSESSMENT/Changes in Function: Patient continues to show improvements with signs/ symptoms however still demonstrates a decrease in strength, impaired ROM, and an increase in pain with functional activities. Patient will continue to benefit from skilled PT services to modify and progress therapeutic interventions, address functional mobility deficits, address strength deficits, analyze and cue movement patterns and analyze and modify body mechanics/ergonomics to attain remaining goals. [x]  See Plan of Care 
[]  See progress note/recertification 
[]  See Discharge Summary Progress towards goals / Updated goals: 
Updated Goals to be accomplished in 10 treatments: 
         9 patient will have FOTO 72 to show improving function with household chores             VT 47 
            [id number] :  
            2 patient will have gentle MMT Right shoulder 4 to aid with increase tolerance to caring for grand child 
            PN NA 
            CURRENT : Progressing Flexion 4-/5, abduction 4-/5, ER 5/5, IR 4-/5  
             3 Patient will have AAROM-AROM  Right shoulder F/ and IR HBB to LS region  to aid with increase tolerance to ADLS and activities for home. 
            PN Flexion 140    abd 130, ER 80  IR to hip 
            CURRENT : Progressing  Flexion 143, abduction 134 PLAN [x]  Upgrade activities as tolerated     [x]  Continue plan of care 
[]  Update interventions per flow sheet      
[]  Discharge due to:_ 
[]  Other:_ Lubna Burgos PT 2/12/2019  7:38 AM 
 
Future Appointments Date Time Provider Ana [place] 2/12/2019  9:00 AM Tavares Cuevas PT [facility]  
2/14/2019  9:00 AM Tavares Cuevas, PT [facility]  
2/19/2019  9:00 AM Tavares Cuevas, PT [facility]  
2/21/2019  9:00 AM Tavares Cuevas, PT [facility]

## 2019-02-14 ENCOUNTER — HOSPITAL ENCOUNTER (OUTPATIENT)
Dept: PHYSICAL THERAPY | Age: 74
Discharge: HOME | End: 2019-02-14
Payer: MEDICARE

## 2019-02-14 PROCEDURE — 97140 MANUAL THERAPY 1/> REGIONS: CPT

## 2019-02-14 PROCEDURE — 97110 THERAPEUTIC EXERCISES: CPT

## 2019-02-14 NOTE — PROGRESS NOTES
PT DAILY TREATMENT NOTE 10-18 Patient Name: Steven Navarro Date:2019 : 1945 [x]  Patient  Verified Payor: [place] MEDICARE / Plan: Darrell Naranjo / Product Type: Medicare / In time:9:00  Out time:9:33 Total Treatment Time (min): 33 Visit #: 6 of 10 Medicare/BCBS Only Total Timed Codes (min):  33 1:1 Treatment Time:  35 Treatment Area: Pain in right shoulder [M25.511] SUBJECTIVE Pain Level (0-10 scale): \"sore\" Any medication changes, allergies to medications, adverse drug reactions, diagnosis change, or new procedure performed?: [x] No    [] Yes (see summary sheet for update) Subjective functional status/changes:   [] No changes reported States she was sore after last session, has been icing a lot. She has a lot of cooking/ cleaning to do this week OBJECTIVE 25 min Therapeutic Exercise:  [x] See flow sheet :  
Rationale: increase strength, improve coordination and increase proprioception to improve the patients ability to perform daily household chores. 8 min Manual Therapy:  PROM with overpressure flexion/ abd/ IR/ ER, LAD with oscillation Rationale: decrease pain, increase ROM and increase tissue extensibility to assist with daily chores such as cooking/ cleaning With 
 [] TE 
 [] TA 
 [] neuro 
 [] other: Patient Education: [x] Review HEP [] Progressed/Changed HEP based on:  
[] positioning   [] body mechanics   [] transfers   [] heat/ice application   
[] other:   
 
Other Objective/Functional Measures: Tolerated all therex well- decreased weight this session due to soreness and need to perform heavy housework this week Pain Level (0-10 scale) post treatment: \"sore\" ASSESSMENT/Changes in Function: Patient continues to show improvements with signs/ symptoms however still demonstrates a decrease in strength, impaired ROM,  and an increase in pain with functional activities. Patient will continue to benefit from skilled PT services to modify and progress therapeutic interventions, address functional mobility deficits, address strength deficits, analyze and cue movement patterns and analyze and modify body mechanics/ergonomics to attain remaining goals. [x]  See Plan of Care 
[]  See progress note/recertification 
[]  See Discharge Summary Progress towards goals / Updated goals: 
Updated Goals to be accomplished in 10 treatments: 
         7 patient will have FOTO 72 to show improving function with household chores             BW 10 
            CURRENT :  
            2 patient will have gentle MMT Right shoulder 4 to aid with increase tolerance to caring for grand child 
            PN NA 
            CURRENT : Progressing Flexion 4-/5, abduction 4-/5, ER 5/5, IR 4-/5  
            3 Patient will have AAROM-AROM  Right shoulder F/ and IR HBB to LS region  to aid with increase tolerance to ADLS and activities for home. 
            PN Flexion 140    abd 130, ER 80  IR to hip 
            CURRENT : Progressing  Flexion 143, abduction 134 PLAN [x]  Upgrade activities as tolerated     [x]  Continue plan of care 
[]  Update interventions per flow sheet      
[]  Discharge due to:_ 
[]  Other:_ Brittney Lopez PT 2/14/2019  7:38 AM 
 
Future Appointments Date Time Provider Ana Paris 2/14/2019  9:00 AM Vincent Eckert, PT [facility]  
2/19/2019  9:00 AM Vincent Eckert, PT [facility]  
2/21/2019  9:00 AM Vincent Eckert, PT [facility]

## 2019-02-19 ENCOUNTER — HOSPITAL ENCOUNTER (OUTPATIENT)
Dept: PHYSICAL THERAPY | Age: 74
Discharge: HOME | End: 2019-02-19
Payer: MEDICARE

## 2019-02-19 PROCEDURE — 97140 MANUAL THERAPY 1/> REGIONS: CPT

## 2019-02-19 PROCEDURE — 97110 THERAPEUTIC EXERCISES: CPT

## 2019-02-19 NOTE — PROGRESS NOTES
PT DAILY TREATMENT NOTE 10-18 Patient Name: Kanwal Chua Date:2019 : 1945 [x]  Patient  Verified Payor: [place] MEDICARE / Plan: Darrell Mendenhall y / Product Type: Medicare / In time:8:56  Out time:9:35 Total Treatment Time (min): 39 Visit #: 7 of 10 Medicare/BCBS Only Total Timed Codes (min):  39 1:1 Treatment Time:  44 Treatment Area: Pain in right shoulder [M25.511] SUBJECTIVE Pain Level (0-10 scale): 0 Any medication changes, allergies to medications, adverse drug reactions, diagnosis change, or new procedure performed?: [x] No    [] Yes (see summary sheet for update) Subjective functional status/changes:   [] No changes reported States she is doing well, was sore over the weekend from prolonged cooking/ cleaning OBJECTIVE 29 min Therapeutic Exercise:  [x] See flow sheet :  
Rationale: increase strength, improve coordination and increase proprioception to improve the patients ability to perform daily household chores. 10 min Manual Therapy:  PROM with overpressure flexion/ abd/ IR/ ER, LAD with oscillation Rationale: decrease pain, increase ROM and increase tissue extensibility to assist with tolerance to cooking/ cleaning With 
 [] TE 
 [] TA 
 [] neuro 
 [] other: Patient Education: [x] Review HEP [] Progressed/Changed HEP based on:  
[] positioning   [] body mechanics   [] transfers   [] heat/ice application   
[] other:   
 
Other Objective/Functional Measures: Tolerated all therex well- no verbal cueing required for form D/C next session Pain Level (0-10 scale) post treatment: 0 
 
ASSESSMENT/Changes in Function: Patient continues to show improvements with signs/ symptoms however still demonstrates a decrease in strength, impaired ROM, and an increase in pain with functional activities.   
 
 
Patient will continue to benefit from skilled PT services to modify and progress therapeutic interventions, address functional mobility deficits, address ROM deficits, address strength deficits, analyze and cue movement patterns and analyze and modify body mechanics/ergonomics to attain remaining goals. [x]  See Plan of Care 
[]  See progress note/recertification 
[]  See Discharge Summary Progress towards goals / Updated goals: 
Updated Goals to be accomplished in 10 treatments: 
         2 patient will have FOTO 72 to show improving function with household chores             WC 08 
            CURRENT :  
            2 patient will have gentle MMT Right shoulder 4 to aid with increase tolerance to caring for grand child 
            PN NA 
            CURRENT : Progressing Flexion 4-/5, abduction 4-/5, ER 5/5, IR 4-/5  
            3 Patient will have AAROM-AROM  Right shoulder F/ and IR HBB to LS region  to aid with increase tolerance to ADLS and activities for home. 
            PN Flexion 140    abd 130, ER 80  IR to hip 
            CURRENT : Progressing  Flexion 143, abduction 134 PLAN [x]  Upgrade activities as tolerated     [x]  Continue plan of care 
[]  Update interventions per flow sheet      
[]  Discharge due to:_ 
[]  Other:_ Toma Howell, PT 2/19/2019  8:41 AM 
 
Future Appointments Date Time Provider Ana Paris 2/19/2019  9:00 AM Asia Humphries PT [facility]  
2/21/2019  9:00 AM [facility]

## 2019-02-21 ENCOUNTER — APPOINTMENT (OUTPATIENT)
Dept: PHYSICAL THERAPY | Age: 74
End: 2019-02-21
Payer: MEDICARE

## 2019-03-19 NOTE — PROGRESS NOTES
In [place] [address], [address] [place], [address] [place], [place] [address]y [address],[address] 
[phone] fax Discharge Summary Patient name: [facility] [employer] of Care: 10/26/18 Referral source: Walter Decker [place] : 1945 Medical/Treatment Diagnosis: Pain in right shoulder [M25.511] Payor: Korina Esparza / Plan: [place] MEDICARE PART A & B / Product Type: Medicare /  Onset Date:2017, P/O 18 
   
Prior Hospitalization: see medical history Provider#: [id number] Medications: Verified on Patient Summary List    
Comorbidities: none 
 Prior Level of Function: I all areas of activities, ADLS, Yoga, retired, household chores, community activities Medications: Verified on Patient Summary List 
 
Visits from Start of Care: 30    Missed Visits: 0 Reporting Period : 10/26/18 to 19 Summary of Care: 
Updated Goals to be accomplished in 10 treatments: 
         2 patient will have FOTO 72 to show improving function with household chores             NL 32 
            CURRENT : Progressing  
            1 patient will have gentle MMT Right shoulder 4 to aid with increase tolerance to caring for grand child 
            PN NA 
            CURRENT : Progressing Flexion 4-/5, abduction 4-/5, ER 5/5, IR 4-/5  
            3 Patient will have AAROM-AROM  Right shoulder F/ and IR HBB to LS region  to aid with increase tolerance to ADLS and activities for home. 
            PN Flexion 140    abd 130, ER 80  IR to hip 
            CURRENT : Progressing  Flexion 143, abduction 134 ASSESSMENT/RECOMMENDATIONS: 
Patient has shown significant improvements with strength, activity tolerance and pain control since beginning therapy. She is progressing towards all set goals as shown above, unable to reassess for discharge due to unplanned return back home to [place].  Patient reports having minimal difficulties throughout her day and is able to perform all functional activities. Discharge with HEP at this time. [x]Discontinue therapy progressing towards or have reached established goals []Discontinue therapy due to lack of appreciable progress towards goals []Discontinue therapy due to lack of attendance or compliance [x]Other: Self DC, patient returned home.   
 
Thank you for this referral.  
 
Molly Quiroz, PT 3/19/2019 3:19 PM